# Patient Record
Sex: FEMALE | Race: WHITE | NOT HISPANIC OR LATINO | Employment: FULL TIME | URBAN - METROPOLITAN AREA
[De-identification: names, ages, dates, MRNs, and addresses within clinical notes are randomized per-mention and may not be internally consistent; named-entity substitution may affect disease eponyms.]

---

## 2020-01-21 ENCOUNTER — TRANSCRIBE ORDERS (OUTPATIENT)
Dept: WOMENS IMAGING | Facility: HOSPITAL | Age: 30
End: 2020-01-21

## 2020-01-21 DIAGNOSIS — Z36.89 SCREENING, ANTENATAL, FOR FETAL ANATOMIC SURVEY: Primary | ICD-10-CM

## 2020-02-28 ENCOUNTER — HOSPITAL ENCOUNTER (OUTPATIENT)
Dept: WOMENS IMAGING | Facility: HOSPITAL | Age: 30
Discharge: HOME | End: 2020-02-28
Admitting: OBSTETRICS & GYNECOLOGY

## 2020-02-28 ENCOUNTER — OFFICE VISIT (OUTPATIENT)
Dept: OBSTETRICS AND GYNECOLOGY | Facility: HOSPITAL | Age: 30
End: 2020-02-28

## 2020-02-28 VITALS
WEIGHT: 168.8 LBS | DIASTOLIC BLOOD PRESSURE: 65 MMHG | HEIGHT: 69 IN | BODY MASS INDEX: 25 KG/M2 | SYSTOLIC BLOOD PRESSURE: 101 MMHG

## 2020-02-28 DIAGNOSIS — Z34.90 PREGNANCY, UNSPECIFIED GESTATIONAL AGE: ICD-10-CM

## 2020-02-28 DIAGNOSIS — Z82.79 FAMILY HISTORY OF CONGENITAL HEART DEFECT: Primary | ICD-10-CM

## 2020-02-28 DIAGNOSIS — Z36.89 SCREENING, ANTENATAL, FOR FETAL ANATOMIC SURVEY: ICD-10-CM

## 2020-02-28 DIAGNOSIS — Z84.89 FAMILY HISTORY OF GENETIC DISEASE: ICD-10-CM

## 2020-02-28 PROCEDURE — 93325 DOPPLER ECHO COLOR FLOW MAPG: CPT

## 2020-02-28 PROCEDURE — 76825 ECHO EXAM OF FETAL HEART: CPT | Performed by: OBSTETRICS & GYNECOLOGY

## 2020-02-28 PROCEDURE — 93325 DOPPLER ECHO COLOR FLOW MAPG: CPT | Performed by: OBSTETRICS & GYNECOLOGY

## 2020-02-28 PROCEDURE — 76811 OB US DETAILED SNGL FETUS: CPT | Performed by: OBSTETRICS & GYNECOLOGY

## 2020-02-28 PROCEDURE — 76825 ECHO EXAM OF FETAL HEART: CPT

## 2020-02-28 PROCEDURE — 76811 OB US DETAILED SNGL FETUS: CPT

## 2020-02-28 NOTE — PROGRESS NOTES
Previous child with VSD that resolved by 6 months of age. Denies problems with pregnancy. States NIPT result- Low risk. Next OB appt today.

## 2020-06-16 ENCOUNTER — LAB (OUTPATIENT)
Dept: LAB | Facility: HOSPITAL | Age: 30
End: 2020-06-16

## 2020-06-16 DIAGNOSIS — Z34.83 PRENATAL CARE, SUBSEQUENT PREGNANCY, THIRD TRIMESTER: Primary | ICD-10-CM

## 2020-06-16 PROCEDURE — 87081 CULTURE SCREEN ONLY: CPT

## 2020-06-17 ENCOUNTER — HOSPITAL ENCOUNTER (INPATIENT)
Facility: HOSPITAL | Age: 30
LOS: 1 days | Discharge: HOME | End: 2020-06-18
Attending: OBSTETRICS & GYNECOLOGY | Admitting: OBSTETRICS & GYNECOLOGY

## 2020-06-17 ENCOUNTER — ANESTHESIA (OUTPATIENT)
Dept: LABOR AND DELIVERY | Facility: HOSPITAL | Age: 30
End: 2020-06-17

## 2020-06-17 ENCOUNTER — ANESTHESIA EVENT (OUTPATIENT)
Dept: LABOR AND DELIVERY | Facility: HOSPITAL | Age: 30
End: 2020-06-17

## 2020-06-17 LAB
ABO GROUP BLD: NORMAL
BLD GP AB SCN SERPL QL: NEGATIVE
DEPRECATED RDW RBC AUTO: 44 FL (ref 37–54)
ERYTHROCYTE [DISTWIDTH] IN BLOOD BY AUTOMATED COUNT: 12.7 % (ref 12.3–15.4)
HCT VFR BLD AUTO: 35.9 % (ref 34–46.6)
HGB BLD-MCNC: 11.9 G/DL (ref 12–15.9)
MCH RBC QN AUTO: 31.5 PG (ref 26.6–33)
MCHC RBC AUTO-ENTMCNC: 33.1 G/DL (ref 31.5–35.7)
MCV RBC AUTO: 95 FL (ref 79–97)
PLATELET # BLD AUTO: 216 10*3/MM3 (ref 140–450)
PMV BLD AUTO: 9.3 FL (ref 6–12)
RBC # BLD AUTO: 3.78 10*6/MM3 (ref 3.77–5.28)
RH BLD: POSITIVE
T&S EXPIRATION DATE: NORMAL
WBC NRBC COR # BLD: 12.12 10*3/MM3 (ref 3.4–10.8)

## 2020-06-17 PROCEDURE — 25010000002 FENTANYL CITRATE (PF) 100 MCG/2ML SOLUTION: Performed by: ANESTHESIOLOGY

## 2020-06-17 PROCEDURE — 86900 BLOOD TYPING SEROLOGIC ABO: CPT

## 2020-06-17 PROCEDURE — 25010000002 ROPIVACAINE PER 1 MG: Performed by: ANESTHESIOLOGY

## 2020-06-17 PROCEDURE — 86900 BLOOD TYPING SEROLOGIC ABO: CPT | Performed by: OBSTETRICS & GYNECOLOGY

## 2020-06-17 PROCEDURE — C1755 CATHETER, INTRASPINAL: HCPCS | Performed by: ANESTHESIOLOGY

## 2020-06-17 PROCEDURE — 85027 COMPLETE CBC AUTOMATED: CPT | Performed by: OBSTETRICS & GYNECOLOGY

## 2020-06-17 PROCEDURE — 86901 BLOOD TYPING SEROLOGIC RH(D): CPT

## 2020-06-17 PROCEDURE — 59025 FETAL NON-STRESS TEST: CPT

## 2020-06-17 PROCEDURE — 25010000002 PENICILLIN G POTASSIUM PER 600000 UNITS: Performed by: OBSTETRICS & GYNECOLOGY

## 2020-06-17 PROCEDURE — 86901 BLOOD TYPING SEROLOGIC RH(D): CPT | Performed by: OBSTETRICS & GYNECOLOGY

## 2020-06-17 PROCEDURE — 0KQM0ZZ REPAIR PERINEUM MUSCLE, OPEN APPROACH: ICD-10-PCS | Performed by: OBSTETRICS & GYNECOLOGY

## 2020-06-17 PROCEDURE — C1755 CATHETER, INTRASPINAL: HCPCS

## 2020-06-17 PROCEDURE — 51703 INSERT BLADDER CATH COMPLEX: CPT

## 2020-06-17 PROCEDURE — 86850 RBC ANTIBODY SCREEN: CPT | Performed by: OBSTETRICS & GYNECOLOGY

## 2020-06-17 RX ORDER — LANOLIN
CREAM (ML) TOPICAL
Status: DISCONTINUED | OUTPATIENT
Start: 2020-06-17 | End: 2020-06-18 | Stop reason: HOSPADM

## 2020-06-17 RX ORDER — MISOPROSTOL 200 UG/1
800 TABLET ORAL AS NEEDED
Status: DISCONTINUED | OUTPATIENT
Start: 2020-06-17 | End: 2020-06-17 | Stop reason: HOSPADM

## 2020-06-17 RX ORDER — PROMETHAZINE HYDROCHLORIDE 12.5 MG/1
12.5 SUPPOSITORY RECTAL EVERY 6 HOURS PRN
Status: DISCONTINUED | OUTPATIENT
Start: 2020-06-17 | End: 2020-06-18 | Stop reason: HOSPADM

## 2020-06-17 RX ORDER — SODIUM CHLORIDE 0.9 % (FLUSH) 0.9 %
1-10 SYRINGE (ML) INJECTION AS NEEDED
Status: DISCONTINUED | OUTPATIENT
Start: 2020-06-17 | End: 2020-06-18 | Stop reason: HOSPADM

## 2020-06-17 RX ORDER — HYDROCORTISONE 25 MG/G
1 CREAM TOPICAL AS NEEDED
Status: DISCONTINUED | OUTPATIENT
Start: 2020-06-17 | End: 2020-06-18 | Stop reason: HOSPADM

## 2020-06-17 RX ORDER — ROPIVACAINE HYDROCHLORIDE 2 MG/ML
15 INJECTION, SOLUTION EPIDURAL; INFILTRATION; PERINEURAL CONTINUOUS
Status: DISCONTINUED | OUTPATIENT
Start: 2020-06-17 | End: 2020-06-18 | Stop reason: HOSPADM

## 2020-06-17 RX ORDER — MAGNESIUM CARB/ALUMINUM HYDROX 105-160MG
30 TABLET,CHEWABLE ORAL ONCE
Status: DISCONTINUED | OUTPATIENT
Start: 2020-06-17 | End: 2020-06-17 | Stop reason: HOSPADM

## 2020-06-17 RX ORDER — DOCUSATE SODIUM 100 MG/1
100 CAPSULE, LIQUID FILLED ORAL 2 TIMES DAILY
Status: DISCONTINUED | OUTPATIENT
Start: 2020-06-17 | End: 2020-06-18 | Stop reason: HOSPADM

## 2020-06-17 RX ORDER — SODIUM CHLORIDE, SODIUM LACTATE, POTASSIUM CHLORIDE, CALCIUM CHLORIDE 600; 310; 30; 20 MG/100ML; MG/100ML; MG/100ML; MG/100ML
125 INJECTION, SOLUTION INTRAVENOUS CONTINUOUS
Status: DISCONTINUED | OUTPATIENT
Start: 2020-06-17 | End: 2020-06-18 | Stop reason: HOSPADM

## 2020-06-17 RX ORDER — PRENATAL VIT/IRON FUM/FOLIC AC 27MG-0.8MG
1 TABLET ORAL DAILY
Status: DISCONTINUED | OUTPATIENT
Start: 2020-06-17 | End: 2020-06-18 | Stop reason: HOSPADM

## 2020-06-17 RX ORDER — PROMETHAZINE HYDROCHLORIDE 25 MG/1
25 TABLET ORAL EVERY 6 HOURS PRN
Status: DISCONTINUED | OUTPATIENT
Start: 2020-06-17 | End: 2020-06-18 | Stop reason: HOSPADM

## 2020-06-17 RX ORDER — DIPHENHYDRAMINE HYDROCHLORIDE 50 MG/ML
12.5 INJECTION INTRAMUSCULAR; INTRAVENOUS EVERY 8 HOURS PRN
Status: DISCONTINUED | OUTPATIENT
Start: 2020-06-17 | End: 2020-06-17 | Stop reason: HOSPADM

## 2020-06-17 RX ORDER — EPHEDRINE SULFATE/0.9% NACL/PF 25 MG/5 ML
10 SYRINGE (ML) INTRAVENOUS
Status: DISCONTINUED | OUTPATIENT
Start: 2020-06-17 | End: 2020-06-17 | Stop reason: HOSPADM

## 2020-06-17 RX ORDER — ONDANSETRON 2 MG/ML
4 INJECTION INTRAMUSCULAR; INTRAVENOUS ONCE AS NEEDED
Status: DISCONTINUED | OUTPATIENT
Start: 2020-06-17 | End: 2020-06-17 | Stop reason: HOSPADM

## 2020-06-17 RX ORDER — TRISODIUM CITRATE DIHYDRATE AND CITRIC ACID MONOHYDRATE 500; 334 MG/5ML; MG/5ML
30 SOLUTION ORAL ONCE
Status: DISCONTINUED | OUTPATIENT
Start: 2020-06-17 | End: 2020-06-17 | Stop reason: HOSPADM

## 2020-06-17 RX ORDER — PENICILLIN G 3000000 [IU]/50ML
3 INJECTION, SOLUTION INTRAVENOUS EVERY 4 HOURS
Status: DISPENSED | OUTPATIENT
Start: 2020-06-17 | End: 2020-06-18

## 2020-06-17 RX ORDER — OXYTOCIN-SODIUM CHLORIDE 0.9% IV SOLN 30 UNIT/500ML 30-0.9/5 UT/ML-%
650 SOLUTION INTRAVENOUS ONCE
Status: COMPLETED | OUTPATIENT
Start: 2020-06-17 | End: 2020-06-17

## 2020-06-17 RX ORDER — PENICILLIN G 3000000 [IU]/50ML
3 INJECTION, SOLUTION INTRAVENOUS EVERY 4 HOURS
Status: DISCONTINUED | OUTPATIENT
Start: 2020-06-17 | End: 2020-06-17 | Stop reason: SDUPTHER

## 2020-06-17 RX ORDER — ONDANSETRON 2 MG/ML
4 INJECTION INTRAMUSCULAR; INTRAVENOUS EVERY 6 HOURS PRN
Status: DISCONTINUED | OUTPATIENT
Start: 2020-06-17 | End: 2020-06-17 | Stop reason: HOSPADM

## 2020-06-17 RX ORDER — SODIUM CHLORIDE 0.9 % (FLUSH) 0.9 %
10 SYRINGE (ML) INJECTION AS NEEDED
Status: DISCONTINUED | OUTPATIENT
Start: 2020-06-17 | End: 2020-06-17 | Stop reason: HOSPADM

## 2020-06-17 RX ORDER — BISACODYL 10 MG
10 SUPPOSITORY, RECTAL RECTAL DAILY PRN
Status: DISCONTINUED | OUTPATIENT
Start: 2020-06-18 | End: 2020-06-18 | Stop reason: HOSPADM

## 2020-06-17 RX ORDER — LIDOCAINE HYDROCHLORIDE AND EPINEPHRINE 15; 5 MG/ML; UG/ML
INJECTION, SOLUTION EPIDURAL AS NEEDED
Status: DISCONTINUED | OUTPATIENT
Start: 2020-06-17 | End: 2020-06-17 | Stop reason: SURG

## 2020-06-17 RX ORDER — IBUPROFEN 600 MG/1
600 TABLET ORAL EVERY 6 HOURS PRN
Status: DISCONTINUED | OUTPATIENT
Start: 2020-06-17 | End: 2020-06-18 | Stop reason: HOSPADM

## 2020-06-17 RX ORDER — OXYTOCIN-SODIUM CHLORIDE 0.9% IV SOLN 30 UNIT/500ML 30-0.9/5 UT/ML-%
85 SOLUTION INTRAVENOUS ONCE
Status: COMPLETED | OUTPATIENT
Start: 2020-06-17 | End: 2020-06-17

## 2020-06-17 RX ORDER — SODIUM CHLORIDE 0.9 % (FLUSH) 0.9 %
3 SYRINGE (ML) INJECTION EVERY 12 HOURS SCHEDULED
Status: DISCONTINUED | OUTPATIENT
Start: 2020-06-17 | End: 2020-06-17 | Stop reason: HOSPADM

## 2020-06-17 RX ORDER — HYDROCODONE BITARTRATE AND ACETAMINOPHEN 5; 325 MG/1; MG/1
1 TABLET ORAL EVERY 4 HOURS PRN
Status: DISCONTINUED | OUTPATIENT
Start: 2020-06-17 | End: 2020-06-18 | Stop reason: HOSPADM

## 2020-06-17 RX ORDER — ONDANSETRON 4 MG/1
4 TABLET, FILM COATED ORAL EVERY 6 HOURS PRN
Status: DISCONTINUED | OUTPATIENT
Start: 2020-06-17 | End: 2020-06-17 | Stop reason: HOSPADM

## 2020-06-17 RX ORDER — BUTORPHANOL TARTRATE 1 MG/ML
1 INJECTION, SOLUTION INTRAMUSCULAR; INTRAVENOUS
Status: DISCONTINUED | OUTPATIENT
Start: 2020-06-17 | End: 2020-06-17 | Stop reason: HOSPADM

## 2020-06-17 RX ORDER — METOCLOPRAMIDE HYDROCHLORIDE 5 MG/ML
10 INJECTION INTRAMUSCULAR; INTRAVENOUS ONCE AS NEEDED
Status: DISCONTINUED | OUTPATIENT
Start: 2020-06-17 | End: 2020-06-17 | Stop reason: HOSPADM

## 2020-06-17 RX ORDER — PROMETHAZINE HYDROCHLORIDE 25 MG/ML
12.5 INJECTION, SOLUTION INTRAMUSCULAR; INTRAVENOUS EVERY 6 HOURS PRN
Status: DISCONTINUED | OUTPATIENT
Start: 2020-06-17 | End: 2020-06-18 | Stop reason: HOSPADM

## 2020-06-17 RX ORDER — FAMOTIDINE 10 MG/ML
20 INJECTION, SOLUTION INTRAVENOUS ONCE AS NEEDED
Status: DISCONTINUED | OUTPATIENT
Start: 2020-06-17 | End: 2020-06-17 | Stop reason: HOSPADM

## 2020-06-17 RX ORDER — METHYLERGONOVINE MALEATE 0.2 MG/ML
200 INJECTION INTRAVENOUS ONCE AS NEEDED
Status: DISCONTINUED | OUTPATIENT
Start: 2020-06-17 | End: 2020-06-17 | Stop reason: HOSPADM

## 2020-06-17 RX ORDER — CARBOPROST TROMETHAMINE 250 UG/ML
250 INJECTION, SOLUTION INTRAMUSCULAR AS NEEDED
Status: DISCONTINUED | OUTPATIENT
Start: 2020-06-17 | End: 2020-06-17 | Stop reason: HOSPADM

## 2020-06-17 RX ORDER — LIDOCAINE HYDROCHLORIDE 10 MG/ML
5 INJECTION, SOLUTION EPIDURAL; INFILTRATION; INTRACAUDAL; PERINEURAL AS NEEDED
Status: DISCONTINUED | OUTPATIENT
Start: 2020-06-17 | End: 2020-06-17 | Stop reason: HOSPADM

## 2020-06-17 RX ORDER — ONDANSETRON 4 MG/1
4 TABLET, FILM COATED ORAL EVERY 8 HOURS PRN
Status: DISCONTINUED | OUTPATIENT
Start: 2020-06-17 | End: 2020-06-18 | Stop reason: HOSPADM

## 2020-06-17 RX ORDER — FENTANYL CITRATE 50 UG/ML
INJECTION, SOLUTION INTRAMUSCULAR; INTRAVENOUS AS NEEDED
Status: DISCONTINUED | OUTPATIENT
Start: 2020-06-17 | End: 2020-06-17 | Stop reason: SURG

## 2020-06-17 RX ADMIN — IBUPROFEN 600 MG: 600 TABLET, FILM COATED ORAL at 23:23

## 2020-06-17 RX ADMIN — WITCH HAZEL 1 PAD: 500 SOLUTION RECTAL; TOPICAL at 10:22

## 2020-06-17 RX ADMIN — ROPIVACAINE HYDROCHLORIDE 13 ML: 5 INJECTION, SOLUTION EPIDURAL; INFILTRATION; PERINEURAL at 02:05

## 2020-06-17 RX ADMIN — IBUPROFEN 600 MG: 600 TABLET, FILM COATED ORAL at 07:15

## 2020-06-17 RX ADMIN — Medication 10 MG: at 03:17

## 2020-06-17 RX ADMIN — Medication: at 10:21

## 2020-06-17 RX ADMIN — IBUPROFEN 600 MG: 600 TABLET, FILM COATED ORAL at 17:19

## 2020-06-17 RX ADMIN — FENTANYL CITRATE 100 MCG: 50 INJECTION, SOLUTION INTRAMUSCULAR; INTRAVENOUS at 02:08

## 2020-06-17 RX ADMIN — SODIUM CHLORIDE 5 MILLION UNITS: 900 INJECTION INTRAVENOUS at 02:04

## 2020-06-17 RX ADMIN — Medication 10 MG: at 02:32

## 2020-06-17 RX ADMIN — SODIUM CHLORIDE, POTASSIUM CHLORIDE, SODIUM LACTATE AND CALCIUM CHLORIDE 1000 ML: 600; 310; 30; 20 INJECTION, SOLUTION INTRAVENOUS at 01:20

## 2020-06-17 RX ADMIN — Medication: at 10:23

## 2020-06-17 RX ADMIN — LIDOCAINE HYDROCHLORIDE AND EPINEPHRINE 3 ML: 15; 5 INJECTION, SOLUTION EPIDURAL at 02:00

## 2020-06-17 RX ADMIN — OXYTOCIN 650 ML/HR: 10 INJECTION, SOLUTION INTRAMUSCULAR; INTRAVENOUS at 06:12

## 2020-06-17 RX ADMIN — LIDOCAINE HYDROCHLORIDE AND EPINEPHRINE 2 ML: 15; 5 INJECTION, SOLUTION EPIDURAL at 02:02

## 2020-06-17 RX ADMIN — ROPIVACAINE HYDROCHLORIDE 15 ML/HR: 2 INJECTION, SOLUTION EPIDURAL; INFILTRATION at 02:16

## 2020-06-17 RX ADMIN — OXYTOCIN 85 ML/HR: 10 INJECTION, SOLUTION INTRAMUSCULAR; INTRAVENOUS at 06:43

## 2020-06-17 RX ADMIN — PRENATAL VITAMINS-IRON FUMARATE 27 MG IRON-FOLIC ACID 0.8 MG TABLET 1 TABLET: at 10:22

## 2020-06-17 RX ADMIN — DOCUSATE SODIUM 100 MG: 100 CAPSULE, LIQUID FILLED ORAL at 10:22

## 2020-06-17 NOTE — H&P
"History and Physical  [facility]    Chief Complaint   Patient presents with   • Laboring       Patient Active Problem List   Diagnosis   • Family history of congenital heart defect   • Family history of genetic disease   • Pregnancy       Smita Brown is a 29 y.o. year old  with an Estimated Date of Delivery: 20 currently at 36w3d presenting with regular contractions.    Prenatal care has been with Dr. Molina.  It has been significant for none.    No Additional Complaints Reported    The following portions of the patient's history were reviewed and updated as appropriate:vital signs, allergies, current medications, past medical history, past social history, past surgical history and problem list.    Review of Systems  Pertinent items are noted in HPI.  A comprehensive review of systems was negative.     Objective     /65   Pulse 73   Temp 98.7 °F (37.1 °C) (Oral)   Resp 16   Ht 175.3 cm (69\")   Wt 87.5 kg (193 lb)   LMP 10/06/2019   Breastfeeding Yes   BMI 28.50 kg/m²     Physical Exam    General:  well developed; well nourished  no acute distress           Abdomen: soft, non-tender; no masses  no umbilical or inguinal hernias are present  no hepato-splenomegaly       FHT's: reactive and category 1   Cervix: 7 cm dilated   [place]: Contraction are Q2 minutes     Lab Review   Labs: No data reviewed   Lab Results (last 24 hours)     Procedure Component Value Units Date/Time    CBC (No Diff) [[id number]]  (Abnormal) Collected:  20    Specimen:  Blood Updated:  20     WBC 12.12 10*3/mm3      RBC 3.78 10*6/mm3      Hemoglobin 11.9 g/dL      Hematocrit 35.9 %      MCV 95.0 fL      MCH 31.5 pg      MCHC 33.1 g/dL      RDW 12.7 %      RDW-SD 44.0 fl      MPV 9.3 fL      Platelets 216 10*3/mm3           Imaging   No data reviewed   Imaging Results (Most Recent)     None        Assessment/Plan     ASSESSMENT  1. IUP at 36w3d  2.  onset of labor  3. GBS " unknown    PLAN  1. Expectant management. Will treat with PCN G due to prematurity         Ashu Molina MD  6/17/202001:41

## 2020-06-17 NOTE — PLAN OF CARE
Problem: Breastfeeding (Pediatric,[place],NICU)  Intervention: Support Exclusive Breastfeeding Success  Flowsheets (Taken 2020 174)  Parent/Child Attachment Promotion: cue recognition promoted; face-to-face positioning promoted; parent/caregiver presence encouraged; participation in care promoted; positive reinforcement provided; rooming-in promoted; skin-to-skin contact encouraged  Intervention: Provide Support During Feeding Sessions  Flowsheets (Taken 2020 174)  Breastfeeding Support: assisted with latch; assisted with positioning; electric breast pump used; encouragement provided; feeding on demand promoted; feeding session observed; infant moved to breast; infant latch-on verified; infant-mother separation minimized; support offered; infant stimulated to wakeful state  Note:   Initiated pumping with hospital breast pump and encouraged to pump any time baby doesn't nurse q 2-3 hrs.  Instructed how to use pump, clean kit, and collect breast milk  Intervention: Promote Positive Maternal Experience  Flowsheets (Taken 2020 174)  Psychosocial Support: care explained to patient/family prior to performing; choices provided for parent/caregiver; support provided; supportive/safe environment provided

## 2020-06-17 NOTE — PAYOR COMM NOTE
"Ramesh Brown (29 y.o. Female)   Auth#[id number]  Delivery information    Date of Birth Social Security Number Address Home Phone MRN    [id number]  [id number] [facility] [address] [phone] [id number]    Amish Marital Status          None        Admission Date Admission Type Admitting Provider Attending Provider Department, Room/Bed    6/17/20 Elective Ashu Molina MD Forester, Corey Blake, MD [facility] MOTHER BABY 4B, N441/1    Discharge Date Discharge Disposition Discharge Destination                       Attending Provider:  Ashu Molina MD    Allergies:  No Known Allergies    Isolation:  None   Infection:  None   Code Status:  CPR    Ht:  175.3 cm (69\")   Wt:  87.5 kg (193 lb)    Admission Cmt:  None   Principal Problem:  None                Active Insurance as of 6/17/2020     Primary Coverage     Payor Plan Insurance Group Employer/Plan Group    [facility] [id number]     Payor Plan Address Payor Plan Phone Number Payor Plan Fax Number Effective Dates    [address]   1/12/2019 - None Entered    [facility] [phone]       Subscriber Name Subscriber Birth Date Member ID       RAMESH BROWN 1990 [id number]                 Emergency Contacts      (Rel.) Home Phone Work Phone Mobile Phone    NEGRITA BROWN (Spouse) [phone] -- --            Vital Signs (last day)     Date/Time   Temp   Temp src   Pulse   Resp   BP   Patient Position   SpO2    06/17/20 0900   98.2 (36.8)   Oral   65   16   109/56   --   --    06/17/20 0800   98.2 (36.8)   Oral   70   18   107/56   --   --    06/17/20 0730   --   --   --   16   --   --   --    06/17/20 0720   --   --   70   --   110/61   --   --    06/17/20 0715   --   --   --   16   --   --   --    06/17/20 0710   97.8 (36.6)   Oral   66   16   116/66   Lying   --    06/17/20 0704   --   --   67   16   109/59   --   --    06/17/20 0650   --   --   65   16   112/55 "   --   --    06/17/20 0635   --   --   --   16   108/56   --   --    06/17/20 0620   --   --   84   16   117/59   --   --    06/17/20 0615   97.2 (36.2)   Axillary   77   16   121/69   --   --    06/17/20 0509   --   --   70   16   108/62   --   --    06/17/20 0450   --   --   60   --   111/65   --   --    06/17/20 0435   --   --   64   --   109/62   --   --    06/17/20 0420   --   --   62   --   110/65   --   --    06/17/20 0406   97.7 (36.5)   Oral   --   16   --   --   --    06/17/20 0404   --   --   72   --   109/65   --   --    06/17/20 0356   --   --   69   --   105/63   --   --    06/17/20 0347   --   --   86   --   105/63   --   --    06/17/20 0337   --   --   83   --   105/61   --   --    06/17/20 0326   --   --   75   --   109/59   --   --    06/17/20 0316   --   --   93   16   96/60   --   --    06/17/20 0306   --   --   82   --   106/63   --   --    06/17/20 0257   --   --   74   16   107/65   --   --    06/17/20 0243   --   --   93   16   98/62   --   --    06/17/20 0233   --   --   87   16   90/53   --   --    06/17/20 0231   --   --   86   16   (!) 84/50   --   --    06/17/20 0227   --   --   75   16   124/58   --   --    06/17/20 0224   --   --   78   16   114/58   --   --    06/17/20 0221   --   --   78   16   118/60   --   --    06/17/20 0218   --   --   75   16   113/56   --   --    06/17/20 0215   --   --   72   16   120/61   --   --    06/17/20 0213   --   --   70   16   127/55   --   --    06/17/20 0210   --   --   70   16   111/52   --   --    06/17/20 0207   --   --   74   16   142/90   --   --    06/17/20 0201   --   --   68   16   135/67   --   --    06/17/20 0157   --   --   71   16   118/65   --   --    06/17/20 0137   98.7 (37.1)   Oral   73   16   119/65   --   --              Facility-Administered Medications as of 6/17/2020   Medication Dose Route Frequency Provider Last Rate Last Dose   • benzocaine (AMERICAINE) 20 % rectal ointment 1 application  1 application Rectal PRN ,  Ashu Feilx MD       • benzocaine-menthol (DERMOPLAST) 20-0.5 % topical spray   Topical PRN Ashu Molina MD       • [START ON 6/18/2020] bisacodyl (DULCOLAX) suppository 10 mg  10 mg Rectal Daily PRN Ashu Molina MD       • docusate sodium (COLACE) capsule 100 mg  100 mg Oral BID Ashu Molina MD   100 mg at 06/17/20 1022   • HYDROcodone-acetaminophen (NORCO) 5-325 MG per tablet 1 tablet  1 tablet Oral Q4H PRN Ashu Molina MD       • Hydrocortisone (Perianal) (ANUSOL-HC) 2.5 % rectal cream 1 application  1 application Rectal PRN Ashu Molina MD       • ibuprofen (ADVIL,MOTRIN) tablet 600 mg  600 mg Oral Q6H PRN Ashu Molina MD   600 mg at 06/17/20 0715   • [COMPLETED] lactated ringers bolus 1,000 mL  1,000 mL Intravenous Once PRN Ashu Molina MD 4,000 mL/hr at 06/17/20 0120 1,000 mL at 06/17/20 0120   • lactated ringers infusion  125 mL/hr Intravenous Continuous Ashu Molina MD       • lanolin cream   Topical Q1H PRN Ashu Molina MD       • magnesium hydroxide (MILK OF MAGNESIA) suspension 2400 mg/10mL 10 mL  10 mL Oral Daily PRN Ashu Molina MD       • ondansetron (ZOFRAN) tablet 4 mg  4 mg Oral Q8H PRN Ashu Molina MD       • [COMPLETED] oxytocin in sodium chloride (PITOCIN) 30 UNIT/500ML infusion solution  650 mL/hr Intravenous Once Ashu Molina  mL/hr at 06/17/20 0612 650 mL/hr at 06/17/20 0612    Followed by   • [COMPLETED] oxytocin in sodium chloride (PITOCIN) 30 UNIT/500ML infusion solution  85 mL/hr Intravenous Once Ashu Molina MD 85 mL/hr at 06/17/20 0643 85 mL/hr at 06/17/20 0643   • [COMPLETED] penicillin g 5 mu/100 mL 0.9% NS IVPB (mbp)  5 Million Units Intravenous Once High, Gonzales DORSEY MD   5 Million Units at 06/17/20 0204    Followed by   • penicillin G in iso-osmotic dextrose IVPB 3 million units (premix)  3 Million Units Intravenous Q4H High, Gonzales DORSEY MD       •  prenatal vitamin tablet 1 tablet  1 tablet Oral Daily Ashu Molina MD   1 tablet at 20 1022   • promethazine (PHENERGAN) tablet 25 mg  25 mg Oral Q6H PRN Ashu Molina MD        Or   • promethazine (PHENERGAN) injection 12.5 mg  12.5 mg Intramuscular Q6H PRN Ashu Molina MD        Or   • promethazine (PHENERGAN) suppository 12.5 mg  12.5 mg Rectal Q6H PRN Ashu Molina MD       • ropivacaine (NAROPIN) 0.2 % injection  15 mL/hr Epidural Continuous Fernanda Granados DO 15 mL/hr at 20 0216 15 mL/hr at 20 0216   • sodium chloride 0.9 % flush 1-10 mL  1-10 mL Intravenous PRN Ashu Molina MD       • witch hazel-glycerin (TUCKS) pad 1 pad  1 each Topical PRN Ashu Molina MD   1 pad at 20 1022     Lab Results (last 24 hours)     Procedure Component Value Units Date/Time    CBC (No Diff) [[id number]]  (Abnormal) Collected:  20    Specimen:  Blood Updated:  20     WBC 12.12 10*3/mm3      RBC 3.78 10*6/mm3      Hemoglobin 11.9 g/dL      Hematocrit 35.9 %      MCV 95.0 fL      MCH 31.5 pg      MCHC 33.1 g/dL      RDW 12.7 %      RDW-SD 44.0 fl      MPV 9.3 fL      Platelets 216 10*3/mm3         Imaging Results (Last 24 Hours)     ** No results found for the last 24 hours. **           Operative/Procedure Notes (last 24 hours) (Notes from 20 142 through 20 142)      Ashu Molina MD at 20 0623          [facility]  Vaginal Delivery Note    Delivery     Delivery: Vaginal, Spontaneous     YOB: 2022    Time of Birth:  Gestational Age 6:07 AM   36w3d     Anesthesia: Epidural     Delivering clinician: Ashu Molina    Forceps?   No   Vacuum? No    Shoulder dystocia present: No        Delivery narrative:  The patient delivered a vigorously crying male infant by  without complications. Suction was performed at delivery and the infant placed on maternal chest. Cord milking  performed x 4 with delayed cord clamping. Pitocin was given and placenta delivered with gentle downward traction. A 2nd degree laceration repaired with 3.0 vicryl. Good hemostasis noted.     Infant    Findings: male  infant     Infant observations: Weight: 3045 g (6 lb 11.4 oz)   Length: 19  in  Observations/Comments:         Apgars:    @ 1 minute /       @ 5 minutes   Infant Name: Baby boy     Placenta, Cord, and Fluid    Placenta delivered  Spontaneous  at   2020  6:12 AM     Cord:    present.   Nuchal Cord?  yes; Number of nuchal loops present:       Cord blood obtained:   yes   Cord gases obtained:    no   Cord gas results: Venous:  No results found for: PHCVEN    Arterial:  No results found for: PHCART     Repair    Episiotomy: None     No    Lacerations: Yes  Laceration Information  Laceration Repaired?   Perineal: 2nd   yes   Periurethral:         Labial:         Sulcus:         Vaginal:         Cervical:           Suture used for repair: 3-0 Vicryl   Estimated Blood Loss:  200ml           Complications   delivery    Disposition  Mother to Mother Baby/Postpartum  in stable condition currently.  Baby to NBN  in stable condition currently.      Ashu Molina MD  20  06:23        Electronically signed by Ashu Molina MD at 20 0626       Physician Progress Notes (last 24 hours) (Notes from 20 1422 through 20 142)    No notes of this type exist for this encounter.         Consult Notes (last 24 hours) (Notes from 20 1422 through 20 1422)    No notes of this type exist for this encounter.

## 2020-06-17 NOTE — ANESTHESIA PROCEDURE NOTES
Labor Epidural      Patient reassessed immediately prior to procedure    Patient location during procedure: OB  Performed By  Anesthesiologist: Fernnada Granados DO  Preanesthetic Checklist  Completed: patient identified, surgical consent, pre-op evaluation, timeout performed, IV checked, risks and benefits discussed and monitors and equipment checked  Additional Notes  DPE performed using 25g Sheela  Prep:  Pt Position:sitting  Sterile Tech:cap, gloves, mask and sterile barrier  Prep:DuraPrep  Monitoring:blood pressure monitoring  Epidural Block Procedure:  Approach:midline  Guidance:palpation technique  Location:L4-L5  Needle Type:Tuohy  Needle Gauge:17 G  Loss of Resistance Medium: air  Loss of Resistance: 5cm  Cath Depth at skin:11 cm  Paresthesia: none  Aspiration:negative  Test Dose:negative  Number of Attempts: 1  Post Assessment:  Dressing:occlusive dressing applied and secured with tape  Pt Tolerance:patient tolerated the procedure well with no apparent complications  Complications:no

## 2020-06-17 NOTE — LACTATION NOTE
06/17/20 8800   Maternal Information   Person Making Referral other (see comments)  (Courtesy visit, Teaching done for 2nd time bfing mom)   Maternal Reason for Referral breastfeeding currently   Infant Reason for Referral 35-37 weeks gestation   Maternal Assessment   Breast Size Issue none   Breast Shape Bilateral:;round   Breast Density Bilateral:;soft   Nipples Bilateral:;graspable;short   Maternal Infant Feeding   Maternal Emotional State assist needed;relaxed   Infant Positioning clutch/football   Signs of Milk Transfer audible swallow   Pain with Feeding no   Comfort Measures Before/During Feeding infant position adjusted;latch adjusted;suction broken using finger   Comfort Measures Following Feeding air-drying encouraged;other (see comments)  (Lanolin encouraged)   Nipple Shape After Feeding, Left Breast round;symmetrical   Nipple Shape After Feeding, Right round;symmetrical   Latch Assistance yes   Equipment Type   Breast Pump Type double electric, hospital grade;other (see comments)  (Has Spectra breast pump at home)   Breast Pump Flange Type hard   Breast Pump Flange Size 24 mm   Breast Pumping   Breast Pumping Interventions frequent pumping encouraged  (Pump anytime baby doesn't nurse 2-3 hrs)   Breast Pumping other (see comments)  (Pump on initiation phase until pump shuts down)

## 2020-06-17 NOTE — L&D DELIVERY NOTE
[facility]  Vaginal Delivery Note    Delivery     Delivery: Vaginal, Spontaneous     YOB: 2022    Time of Birth:  Gestational Age 6:07 AM   36w3d     Anesthesia: Epidural     Delivering clinician: Ashu Molina    Forceps?   No   Vacuum? No    Shoulder dystocia present: No        Delivery narrative:  The patient delivered a vigorously crying male infant by  without complications. Suction was performed at delivery and the infant placed on maternal chest. Cord milking performed x 4 with delayed cord clamping. Pitocin was given and placenta delivered with gentle downward traction. A 2nd degree laceration repaired with 3.0 vicryl. Good hemostasis noted.     Infant    Findings: male  infant     Infant observations: Weight: 3045 g (6 lb 11.4 oz)   Length: 19  in  Observations/Comments:         Apgars:    @ 1 minute /       @ 5 minutes   Infant Name: Baby boy     Placenta, Cord, and Fluid    Placenta delivered  Spontaneous  at   2020  6:12 AM     Cord:    present.   Nuchal Cord?  yes; Number of nuchal loops present:       Cord blood obtained:   yes   Cord gases obtained:    no   Cord gas results: Venous:  No results found for: PHCVEN    Arterial:  No results found for: PHCART     Repair    Episiotomy: None     No    Lacerations: Yes  Laceration Information  Laceration Repaired?   Perineal: 2nd   yes   Periurethral:         Labial:         Sulcus:         Vaginal:         Cervical:           Suture used for repair: 3-0 Vicryl   Estimated Blood Loss:  200ml           Complications   delivery    Disposition  Mother to Mother Baby/Postpartum  in stable condition currently.  Baby to NBN  in stable condition currently.      Ashu Molina MD  20  06:23

## 2020-06-18 VITALS
BODY MASS INDEX: 28.58 KG/M2 | HEIGHT: 69 IN | RESPIRATION RATE: 16 BRPM | HEART RATE: 61 BPM | SYSTOLIC BLOOD PRESSURE: 120 MMHG | WEIGHT: 193 LBS | TEMPERATURE: 98.2 F | DIASTOLIC BLOOD PRESSURE: 61 MMHG

## 2020-06-18 LAB
BASOPHILS # BLD AUTO: 0.05 10*3/MM3 (ref 0–0.2)
BASOPHILS NFR BLD AUTO: 0.5 % (ref 0–1.5)
DEPRECATED RDW RBC AUTO: 45.1 FL (ref 37–54)
EOSINOPHIL # BLD AUTO: 0.13 10*3/MM3 (ref 0–0.4)
EOSINOPHIL NFR BLD AUTO: 1.4 % (ref 0.3–6.2)
ERYTHROCYTE [DISTWIDTH] IN BLOOD BY AUTOMATED COUNT: 13 % (ref 12.3–15.4)
HCT VFR BLD AUTO: 35.6 % (ref 34–46.6)
HGB BLD-MCNC: 11.6 G/DL (ref 12–15.9)
IMM GRANULOCYTES # BLD AUTO: 0.2 10*3/MM3 (ref 0–0.05)
IMM GRANULOCYTES NFR BLD AUTO: 2.1 % (ref 0–0.5)
LYMPHOCYTES # BLD AUTO: 1.9 10*3/MM3 (ref 0.7–3.1)
LYMPHOCYTES NFR BLD AUTO: 20.2 % (ref 19.6–45.3)
MCH RBC QN AUTO: 31.1 PG (ref 26.6–33)
MCHC RBC AUTO-ENTMCNC: 32.6 G/DL (ref 31.5–35.7)
MCV RBC AUTO: 95.4 FL (ref 79–97)
MONOCYTES # BLD AUTO: 0.54 10*3/MM3 (ref 0.1–0.9)
MONOCYTES NFR BLD AUTO: 5.7 % (ref 5–12)
NEUTROPHILS # BLD AUTO: 6.6 10*3/MM3 (ref 1.7–7)
NEUTROPHILS NFR BLD AUTO: 70.1 % (ref 42.7–76)
NRBC BLD AUTO-RTO: 0 /100 WBC (ref 0–0.2)
PLATELET # BLD AUTO: 229 10*3/MM3 (ref 140–450)
PMV BLD AUTO: 9.3 FL (ref 6–12)
RBC # BLD AUTO: 3.73 10*6/MM3 (ref 3.77–5.28)
WBC NRBC COR # BLD: 9.42 10*3/MM3 (ref 3.4–10.8)

## 2020-06-18 PROCEDURE — 85025 COMPLETE CBC W/AUTO DIFF WBC: CPT | Performed by: OBSTETRICS & GYNECOLOGY

## 2020-06-18 RX ORDER — IBUPROFEN 600 MG/1
600 TABLET ORAL EVERY 6 HOURS PRN
Qty: 30 TABLET | Refills: 3 | Status: SHIPPED | OUTPATIENT
Start: 2020-06-18 | End: 2022-12-27

## 2020-06-18 RX ADMIN — IBUPROFEN 600 MG: 600 TABLET, FILM COATED ORAL at 07:42

## 2020-06-18 NOTE — LACTATION NOTE
Patient continues to breastfeed, pump, and supplement infant with pumped milk.  Infant appears to latch and breastfeed well, and patient said it is going better today.

## 2020-06-18 NOTE — DISCHARGE SUMMARY
Discharge Summary    Date of Admission: 2020  Date of Discharge:  2020      Patient: Smita Brown      MR#:[id number]    Delivery Provider: Ashu Molina     Discharge Surgeon/OB: Ashu Molina    Presenting Problem/History of Present Illness  Term pregnancy [Z34.90]     Patient Active Problem List    Diagnosis   • Family history of congenital heart defect [Z82.79]   • Family history of genetic disease [Z84.89]   • Pregnancy [Z34.90]         Discharge Diagnosis: Vaginal delivery at 36w3d    Procedures:  Vaginal, Spontaneous     2020    6:07 AM        Discharge Date: 2020;     Hospital Course  Patient is a 29 y.o. female  at 36w3d status post vaginal delivery without complication. Postpartum the patient did well. She remained afebrile, with vital signs stable. She was ready for discharge on postpartum day 2.     Infant:   male  fetus 3045 g (6 lb 11.4 oz)  with Apgar scores of 8  , 9   at five minutes.    Condition on Discharge:  Stable    Vital Signs  Temp:  [97.5 °F (36.4 °C)-98.5 °F (36.9 °C)] 98.2 °F (36.8 °C)  Heart Rate:  [61-67] 61  Resp:  [14-18] 16  BP: (105-120)/(55-63) 120/61    Lab Results   Component Value Date    WBC 9.42 2020    HGB 11.6 (L) 2020    HCT 35.6 2020    MCV 95.4 2020     2020       Discharge Disposition  Home    Discharge Medications     Discharge Medications      ASK your doctor about these medications      Instructions Start Date   PRENATAL VITAMIN PO   1 tablet, Oral, Daily             Discharge Diet: Regular    Activity at Discharge: Pelvic rest     Follow-up Appointments  Future Appointments   Date Time Provider Department Center   7/3/2020 12:45 PM COVID-19 [facility] C[facility]   2020  9:30 PM [facility] LD INDUCTION ROOM 1 [facility]         Ashu Molina MD  20  14:01  [facility]

## 2020-06-19 LAB — BACTERIA SPEC AEROBE CULT: NORMAL

## 2020-07-03 ENCOUNTER — APPOINTMENT (OUTPATIENT)
Dept: PREADMISSION TESTING | Facility: HOSPITAL | Age: 30
End: 2020-07-03

## 2020-07-03 PROCEDURE — C9803 HOPD COVID-19 SPEC COLLECT: HCPCS

## 2020-07-06 ENCOUNTER — HOSPITAL ENCOUNTER (OUTPATIENT)
Dept: LABOR AND DELIVERY | Facility: HOSPITAL | Age: 30
End: 2020-07-06

## 2020-12-02 ENCOUNTER — OFFICE VISIT (OUTPATIENT)
Dept: OBSTETRICS AND GYNECOLOGY | Facility: CLINIC | Age: 30
End: 2020-12-02

## 2020-12-02 VITALS
SYSTOLIC BLOOD PRESSURE: 102 MMHG | HEIGHT: 69 IN | WEIGHT: 165.1 LBS | DIASTOLIC BLOOD PRESSURE: 68 MMHG | BODY MASS INDEX: 24.45 KG/M2

## 2020-12-02 DIAGNOSIS — Z00.00 ANNUAL PHYSICAL EXAM: Primary | ICD-10-CM

## 2020-12-02 PROCEDURE — 99395 PREV VISIT EST AGE 18-39: CPT | Performed by: OBSTETRICS & GYNECOLOGY

## 2020-12-02 NOTE — PROGRESS NOTES
GYN Annual Exam     CC - Here for annual exam.     Subjective   HPI  Smita Brown is a 30 y.o. female, , who presents for annual well woman exam. Patient's last menstrual period was 11/15/2020 (exact date)..  Periods are regular every 25-35 days, lasting 3 days. The patient uses 1 of tampons/pads per hour., lasting 3 days.  Dysmenorrhea:none.  Patient reports problems with: none.  Partner Status: Marital Status: .  New Partners since last visit: no.  Desires STD Screening: no.    Additional OB/GYN History   Current contraception: contraceptive methods: None  Desires to: do not start contraception  Last Pap : 2019 - negative  Last Completed Pap Smear       Status Date      PAP SMEAR No completions recorded        History of abnormal Pap smear: yes - 5 years ago - followed by [facility] that came back normal.  Family history of uterine, colon, breast, or ovarian cancer: no  Performs monthly Self-Breast Exam: yes  Exercises Regularly:yes  Feelings of Anxiety or Depression: no  Tobacco Usage?: No   OB History        2    Para   2    Term   1       1    AB   0    Living   2       SAB   0    TAB   0    Ectopic   0    Molar   0    Multiple   0    Live Births   2                Health Maintenance   Topic Date Due   • Annual Gynecologic Pelvic and Breast Exam  1990   • ANNUAL PHYSICAL  1991   • TDAP/TD VACCINES (1 - Tdap) 2007   • HEPATITIS C SCREENING  2020   • PAP SMEAR  2020   • INFLUENZA VACCINE  2020   • Pneumococcal Vaccine 0-64  Aged Out       The additional following portions of the patient's history were reviewed and updated as appropriate: allergies, current medications, past family history, past medical history, past social history, past surgical history and problem list.    Review of Systems   Constitutional: Negative for activity change and appetite change.   Eyes: Negative for visual disturbance.   Respiratory: Negative for cough and shortness  "of breath.    Cardiovascular: Negative for chest pain and palpitations.   Gastrointestinal: Negative for abdominal distention, abdominal pain, nausea and rectal pain.   Genitourinary: Negative for breast discharge, breast lump, breast pain, menstrual problem and pelvic pain.   Musculoskeletal: Negative for back pain.   Neurological: Negative for light-headedness and headache.   Psychiatric/Behavioral: Negative for behavioral problems.       I have reviewed and agree with the HPI, ROS, and historical information as entered above. Ashu Molina MD    Objective   /68   Ht 175.3 cm (69\")   Wt 74.9 kg (165 lb 1.6 oz)   LMP 11/15/2020 (Exact Date)   BMI 24.38 kg/m²     Physical Exam  Vitals signs reviewed. Exam conducted with a chaperone present.   Constitutional:       Appearance: Normal appearance. She is normal weight.   HENT:      Head: Normocephalic and atraumatic.   Cardiovascular:      Rate and Rhythm: Normal rate and regular rhythm.   Pulmonary:      Effort: Pulmonary effort is normal.      Breath sounds: Normal breath sounds.   Chest:      Breasts:         Right: Normal.         Left: Normal.   Abdominal:      General: Abdomen is flat. Bowel sounds are normal.      Palpations: Abdomen is soft.   Genitourinary:     General: Normal vulva.      Vagina: Normal.      Cervix: Normal.      Uterus: Normal.       Adnexa: Right adnexa normal and left adnexa normal.      Rectum: Normal.   Skin:     General: Skin is warm and dry.   Neurological:      Mental Status: She is alert and oriented to person, place, and time.   Psychiatric:         Mood and Affect: Mood normal.         Assessment/Plan     Assessment     Problem List Items Addressed This Visit     None      Visit Diagnoses     Annual physical exam    -  Primary    Relevant Orders    Pap IG, HPV-hr    CBC (No Diff)    Hemoglobin A1c    Lipid Panel    TSH    Vitamin D 25 Hydroxy    Comprehensive Metabolic Panel          1. GYN annual well woman exam. "     Plan     1. Reviewed monthly self breast exams.  Instructed to call with lumps, pain, or breast discharge.    2. Reviewed exercise as a preventative health measures.   3. Reccommended Flu Vaccine in Fall of each year.  4. RTC in 1 year or PRN with problems      Ashu Molina MD  12/02/2020

## 2020-12-02 NOTE — PATIENT INSTRUCTIONS
Preventive Care 21-39 Years Old, Female  Preventive care refers to visits with your health care provider and lifestyle choices that can promote health and wellness. This includes:  · A yearly physical exam. This may also be called an annual well check.  · Regular dental visits and eye exams.  · Immunizations.  · Screening for certain conditions.  · Healthy lifestyle choices, such as eating a healthy diet, getting regular exercise, not using drugs or products that contain nicotine and tobacco, and limiting alcohol use.  What can I expect for my preventive care visit?  Physical exam  Your health care provider will check your:  · Height and weight. This may be used to calculate body mass index (BMI), which tells if you are at a healthy weight.  · Heart rate and blood pressure.  · Skin for abnormal spots.  Counseling  Your health care provider may ask you questions about your:  · Alcohol, tobacco, and drug use.  · Emotional well-being.  · Home and relationship well-being.  · Sexual activity.  · Eating habits.  · Work and work environment.  · Method of birth control.  · Menstrual cycle.  · Pregnancy history.  What immunizations do I need?    Influenza (flu) vaccine  · This is recommended every year.  Tetanus, diphtheria, and pertussis (Tdap) vaccine  · You may need a Td booster every 10 years.  Varicella (chickenpox) vaccine  · You may need this if you have not been vaccinated.  Human papillomavirus (HPV) vaccine  · If recommended by your health care provider, you may need three doses over 6 months.  Measles, mumps, and rubella (MMR) vaccine  · You may need at least one dose of MMR. You may also need a second dose.  Meningococcal conjugate (MenACWY) vaccine  · One dose is recommended if you are age 19-21 years and a first-year college student living in a residence [place], or if you have one of several medical conditions. You may also need additional booster doses.  Pneumococcal conjugate (PCV13) vaccine  · You may need  this if you have certain conditions and were not previously vaccinated.  Pneumococcal polysaccharide (PPSV23) vaccine  · You may need one or two doses if you smoke cigarettes or if you have certain conditions.  Hepatitis A vaccine  · You may need this if you have certain conditions or if you travel or work in places where you may be exposed to hepatitis A.  Hepatitis B vaccine  · You may need this if you have certain conditions or if you travel or work in places where you may be exposed to hepatitis B.  Haemophilus influenzae type b (Hib) vaccine  · You may need this if you have certain conditions.  You may receive vaccines as individual doses or as more than one vaccine together in one shot (combination vaccines). Talk with your health care provider about the risks and benefits of combination vaccines.  What tests do I need?    Blood tests  · Lipid and cholesterol levels. These may be checked every 5 years starting at age 20.  · Hepatitis C test.  · Hepatitis B test.  Screening  · Diabetes screening. This is done by checking your blood sugar (glucose) after you have not eaten for a while (fasting).  · Sexually transmitted disease (STD) testing.  · BRCA-related cancer screening. This may be done if you have a family history of breast, ovarian, tubal, or peritoneal cancers.  · Pelvic exam and Pap test. This may be done every 3 years starting at age 21. Starting at age 30, this may be done every 5 years if you have a Pap test in combination with an HPV test.  Talk with your health care provider about your test results, treatment options, and if necessary, the need for more tests.  Follow these instructions at home:  Eating and drinking    · Eat a diet that includes fresh fruits and vegetables, whole grains, lean protein, and low-fat dairy.  · Take vitamin and mineral supplements as recommended by your health care provider.  · Do not drink alcohol if:  ? Your health care provider tells you not to drink.  ? You are  pregnant, may be pregnant, or are planning to become pregnant.  · If you drink alcohol:  ? Limit how much you have to 0-1 drink a day.  ? Be aware of how much alcohol is in your drink. In the [place] one drink equals one 12 oz bottle of beer (355 mL), one 5 oz glass of wine (148 mL), or one 1½ oz glass of hard liquor (44 mL).  Lifestyle  · Take daily care of your teeth and gums.  · Stay active. Exercise for at least 30 minutes on 5 or more days each week.  · Do not use any products that contain nicotine or tobacco, such as cigarettes, e-cigarettes, and chewing tobacco. If you need help quitting, ask your health care provider.  · If you are sexually active, practice safe sex. Use a condom or other form of birth control (contraception) in order to prevent pregnancy and STIs (sexually transmitted infections). If you plan to become pregnant, see your health care provider for a preconception visit.  What's next?  · Visit your health care provider once a year for a well check visit.  · Ask your health care provider how often you should have your eyes and teeth checked.  · Stay up to date on all vaccines.  This information is not intended to replace advice given to you by your health care provider. Make sure you discuss any questions you have with your health care provider.  Document Revised: 08/29/2019 Document Reviewed: 08/29/2019  [employer] Patient Education © 2020 [employer]

## 2020-12-07 DIAGNOSIS — Z00.00 ANNUAL PHYSICAL EXAM: ICD-10-CM

## 2020-12-16 ENCOUNTER — RESULTS ENCOUNTER (OUTPATIENT)
Dept: OBSTETRICS AND GYNECOLOGY | Facility: CLINIC | Age: 30
End: 2020-12-16

## 2020-12-16 DIAGNOSIS — Z00.00 ANNUAL PHYSICAL EXAM: ICD-10-CM

## 2020-12-18 ENCOUNTER — LAB (OUTPATIENT)
Dept: OBSTETRICS AND GYNECOLOGY | Facility: CLINIC | Age: 30
End: 2020-12-18

## 2020-12-19 LAB
25(OH)D3+25(OH)D2 SERPL-MCNC: 21 NG/ML (ref 30–100)
ALBUMIN SERPL-MCNC: 4.5 G/DL (ref 3.5–5.2)
ALBUMIN/GLOB SERPL: 2 G/DL
ALP SERPL-CCNC: 67 U/L (ref 39–117)
ALT SERPL-CCNC: 17 U/L (ref 1–33)
AST SERPL-CCNC: 21 U/L (ref 1–32)
BILIRUB SERPL-MCNC: 0.3 MG/DL (ref 0–1.2)
BUN SERPL-MCNC: 14 MG/DL (ref 6–20)
BUN/CREAT SERPL: 16.1 (ref 7–25)
CALCIUM SERPL-MCNC: 9.2 MG/DL (ref 8.6–10.5)
CHLORIDE SERPL-SCNC: 104 MMOL/L (ref 98–107)
CHOLEST SERPL-MCNC: 181 MG/DL (ref 0–200)
CO2 SERPL-SCNC: 28.2 MMOL/L (ref 22–29)
CREAT SERPL-MCNC: 0.87 MG/DL (ref 0.57–1)
ERYTHROCYTE [DISTWIDTH] IN BLOOD BY AUTOMATED COUNT: 12 % (ref 12.3–15.4)
GLOBULIN SER CALC-MCNC: 2.3 GM/DL
GLUCOSE SERPL-MCNC: 73 MG/DL (ref 65–99)
HBA1C MFR BLD: 4.9 % (ref 4.8–5.6)
HCT VFR BLD AUTO: 36.7 % (ref 34–46.6)
HDLC SERPL-MCNC: 78 MG/DL (ref 40–60)
HGB BLD-MCNC: 12.7 G/DL (ref 12–15.9)
LDLC SERPL CALC-MCNC: 91 MG/DL (ref 0–100)
MCH RBC QN AUTO: 31.8 PG (ref 26.6–33)
MCHC RBC AUTO-ENTMCNC: 34.6 G/DL (ref 31.5–35.7)
MCV RBC AUTO: 91.8 FL (ref 79–97)
PLATELET # BLD AUTO: 264 10*3/MM3 (ref 140–450)
POTASSIUM SERPL-SCNC: 4.6 MMOL/L (ref 3.5–5.2)
PROT SERPL-MCNC: 6.8 G/DL (ref 6–8.5)
RBC # BLD AUTO: 4 10*6/MM3 (ref 3.77–5.28)
SODIUM SERPL-SCNC: 140 MMOL/L (ref 136–145)
TRIGL SERPL-MCNC: 61 MG/DL (ref 0–150)
TSH SERPL DL<=0.005 MIU/L-ACNC: 0.97 UIU/ML (ref 0.27–4.2)
VLDLC SERPL CALC-MCNC: 12 MG/DL (ref 5–40)
WBC # BLD AUTO: 5.04 10*3/MM3 (ref 3.4–10.8)

## 2020-12-21 RX ORDER — ERGOCALCIFEROL 1.25 MG/1
50000 CAPSULE ORAL WEEKLY
Qty: 12 CAPSULE | Refills: 0 | Status: SHIPPED | OUTPATIENT
Start: 2020-12-21 | End: 2021-01-02

## 2021-01-14 ENCOUNTER — IMMUNIZATION (OUTPATIENT)
Dept: VACCINE CLINIC | Facility: HOSPITAL | Age: 31
End: 2021-01-14

## 2021-01-14 PROCEDURE — 0001A: CPT | Performed by: INTERNAL MEDICINE

## 2021-01-14 PROCEDURE — 91300 HC SARSCOV02 VAC 30MCG/0.3ML IM: CPT | Performed by: INTERNAL MEDICINE

## 2021-02-04 ENCOUNTER — IMMUNIZATION (OUTPATIENT)
Dept: VACCINE CLINIC | Facility: HOSPITAL | Age: 31
End: 2021-02-04

## 2021-02-04 PROCEDURE — 0002A: CPT | Performed by: INTERNAL MEDICINE

## 2021-02-04 PROCEDURE — 91300 HC SARSCOV02 VAC 30MCG/0.3ML IM: CPT | Performed by: INTERNAL MEDICINE

## 2021-08-16 ENCOUNTER — TELEPHONE (OUTPATIENT)
Dept: OBSTETRICS AND GYNECOLOGY | Facility: CLINIC | Age: 31
End: 2021-08-16

## 2021-08-16 NOTE — TELEPHONE ENCOUNTER
[pt lvm stating that she has some questions about whether or not she needs to be seen. She stated she has something going on but did not state what it was.

## 2021-08-16 NOTE — TELEPHONE ENCOUNTER
UPT was positive Friday, two days late for her period, with 3 negative UPT's not wanting to be pregnant. Come in for hcg, She has questions about has she had a miscarriage or what, since she is very regular ith her cycles. She can have labs or apt and labs with Dr. Molina.

## 2021-08-17 ENCOUNTER — OFFICE VISIT (OUTPATIENT)
Dept: OBSTETRICS AND GYNECOLOGY | Facility: CLINIC | Age: 31
End: 2021-08-17

## 2021-08-17 VITALS
WEIGHT: 147.3 LBS | SYSTOLIC BLOOD PRESSURE: 118 MMHG | HEIGHT: 69 IN | DIASTOLIC BLOOD PRESSURE: 78 MMHG | BODY MASS INDEX: 21.82 KG/M2

## 2021-08-17 DIAGNOSIS — Z32.00 UNCONFIRMED PREGNANCY: Primary | ICD-10-CM

## 2021-08-17 PROCEDURE — 99212 OFFICE O/P EST SF 10 MIN: CPT | Performed by: OBSTETRICS & GYNECOLOGY

## 2021-08-17 NOTE — PROGRESS NOTES
Chief Complaint   Patient presents with   • Positive Pregnancy Test     recent       Subjective   HPI  Smita Brown is a 31 y.o. female, , who presents for positive UPT last Friday and then two negative UPT the day after. Patient spoke with phone nurse yesterday and was offered to come in for labs but patient requested to come in for labs and office visit to discuss.    Patient denies vaginal bleeding, cramping. The patient reports additional symptoms as none.      Her last LMP was Patient's last menstrual period was 2021 (exact date).  Periods are regular every 28-30 days, lasting 4 days.  Dysmenorrhea:none.  Patient reports problems with: none.  Partner Status: Marital Status: .  New Partners since last visit: no.  Desires STD Screening: no.    Additional OB/GYN History   Current contraception: contraceptive methods: None  Desires to: do not start contraception  Last Pap :   Last Completed Pap Smear          Ordered - PAP SMEAR (Every 3 Years) Ordered on 2020  SCANNED - PAP SMEAR              History of abnormal Pap smear: , patient is unsure of results but states colposcopy was done that resulted in negative.  Last mammogram: never  Last Completed Mammogram     This patient has no relevant Health Maintenance data.        Tobacco Usage?: No   OB History        2    Para   2    Term   1       1    AB   0    Living   2       SAB   0    TAB   0    Ectopic   0    Molar   0    Multiple   0    Live Births   2                Health Maintenance   Topic Date Due   • TDAP/TD VACCINES (1 - Tdap) Never done   • HEPATITIS C SCREENING  Never done   • INFLUENZA VACCINE  10/01/2021   • ANNUAL PHYSICAL  2021   • Annual Gynecologic Pelvic and Breast Exam  2021   • PAP SMEAR  2023   • COVID-19 Vaccine  Completed   • Pneumococcal Vaccine 0-64  Aged Out       The additional following portions of the patient's history were reviewed and updated as  "appropriate: allergies, current medications, past family history, past medical history, past social history, past surgical history and problem list.    Review of Systems   Constitutional: Negative for activity change, appetite change, unexpected weight gain and unexpected weight loss.   Eyes: Negative for visual disturbance.   Respiratory: Negative for cough and shortness of breath.    Cardiovascular: Negative for chest pain and palpitations.   Genitourinary: Positive for amenorrhea. Negative for pelvic pain.   Musculoskeletal: Negative for back pain.   Neurological: Negative for light-headedness and headache.   Psychiatric/Behavioral: Negative for agitation, behavioral problems, decreased concentration and depressed mood.       I have reviewed and agree with the HPI, ROS, and historical information as entered above. Ashu Molina MD    Objective   /78   Ht 175.3 cm (69\")   Wt 66.8 kg (147 lb 4.8 oz)   LMP 07/19/2021 (Exact Date)   Breastfeeding No   BMI 21.75 kg/m²     Physical Exam  Vitals reviewed.   Constitutional:       Appearance: Normal appearance. She is normal weight.   HENT:      Head: Normocephalic and atraumatic.   Abdominal:      General: Abdomen is flat. Bowel sounds are normal.      Palpations: Abdomen is soft.   Skin:     General: Skin is warm and dry.   Neurological:      Mental Status: She is alert and oriented to person, place, and time.   Psychiatric:         Mood and Affect: Mood normal.         Behavior: Behavior normal.         Assessment/Plan     Assessment     Problem List Items Addressed This Visit     None      Visit Diagnoses     Unconfirmed pregnancy    -  Primary    Relevant Orders    HCG, B-subunit, Quantitative    HCG, B-subunit, Quantitative          Plan   1. Missed her last period with pos/neg UPT.  Will draw hcg level today and follow up as needed.   2. Discussed ectopic precautions      Ashu Molina MD  08/17/2021  "

## 2021-08-17 NOTE — PATIENT INSTRUCTIONS
Ectopic Pregnancy    An ectopic pregnancy happens when a fertilized egg grows outside the womb (uterus). The fertilized egg cannot stay alive outside of the womb. This problem often happens in a fallopian tube. It is often caused by damage to the tube.  If this problem is found early, you may be treated with medicine that stops the egg from growing. If your tube tears or bursts open (ruptures), you will bleed inside. Often, there is very bad pain in the lower belly. This is an emergency. You will need surgery. Get help right away.  Follow these instructions at home:  After being treated with medicine or surgery:  · Rest and limit your activity for as long as told by your doctor.  · Until your doctor says that it is safe:  ? Do not lift anything that is heavier than 10 lb (4.5 kg) or the limit that your doctor tells you.  ? Avoid exercise and any movement that takes a lot of effort.  · To prevent problems when pooping (constipation):  ? Eat a healthy diet. This includes:  § Fruits.  § Vegetables.  § Whole grains.  ? Drink 6-8 glasses of water a day.  Contact a doctor if:  Get help right away if:  · You have sudden and very bad pain in your belly.  · You have very bad pain in your shoulders or neck.  · You have pain that gets worse and is not helped by medicine.  · You have:  ? A fever or chills.  ? Vaginal bleeding.  ? Redness or swelling at the site of a surgical cut (incision).  · You feel sick to your stomach (nauseous) or you throw up (vomit).  · You feel dizzy or weak.  · You feel light-headed or you pass out (faint).  Summary  · An ectopic pregnancy happens when a fertilized egg grows outside the womb (uterus).  · If this problem is found early, you may be treated with medicine that stops the egg from growing.  · If your tube tears or bursts open (ruptures), you will need surgery. This is an emergency. Get help right away.  This information is not intended to replace advice given to you by your health care  provider. Make sure you discuss any questions you have with your health care provider.  Document Revised: 11/30/2018 Document Reviewed: 01/11/2018  [employer] Patient Education © 2020 [employer]

## 2021-08-18 LAB — HCG INTACT+B SERPL-ACNC: <0.5 MIU/ML

## 2022-04-13 NOTE — ANESTHESIA POSTPROCEDURE EVALUATION
Patient: Smita Brown    Procedure Summary     Date:  06/17/20 Room / Location:      Anesthesia Start:  0150 Anesthesia Stop:  0612    Procedure:  LABOR ANALGESIA Diagnosis:      Scheduled Providers:   Provider:  Fernanda Granados DO    Anesthesia Type:  epidural ASA Status:  2          Anesthesia Type: epidural    Vitals  Vitals Value Taken Time   /56 6/17/2020  9:00 AM   Temp 98.2 °F (36.8 °C) 6/17/2020  9:00 AM   Pulse 65 6/17/2020  9:00 AM   Resp 16 6/17/2020  9:00 AM   SpO2             Post Anesthesia Care and Evaluation    Patient location during evaluation: bedside  Patient participation: complete - patient participated  Level of consciousness: awake  Pain score: 0  Pain management: satisfactory to patient  Airway patency: patent  Anesthetic complications: No anesthetic complications  PONV Status: none  Cardiovascular status: acceptable and hemodynamically stable  Respiratory status: acceptable  Hydration status: acceptable  Post Neuraxial Block status: Motor and sensory function returned to baseline and No signs or symptoms of PDPH     FAMILY HISTORY:  Family history of aneurysm, Mother, ~ 70s, ruptured  Family history of colon cancer, Father & cousin (F)

## 2022-12-27 ENCOUNTER — OFFICE VISIT (OUTPATIENT)
Dept: OBSTETRICS AND GYNECOLOGY | Facility: CLINIC | Age: 32
End: 2022-12-27

## 2022-12-27 VITALS
DIASTOLIC BLOOD PRESSURE: 84 MMHG | HEIGHT: 69 IN | BODY MASS INDEX: 22.13 KG/M2 | WEIGHT: 149.4 LBS | SYSTOLIC BLOOD PRESSURE: 122 MMHG

## 2022-12-27 DIAGNOSIS — Z01.419 ENCOUNTER FOR ANNUAL ROUTINE GYNECOLOGICAL EXAMINATION: Primary | ICD-10-CM

## 2022-12-27 DIAGNOSIS — N92.6 IRREGULAR PERIODS: ICD-10-CM

## 2022-12-27 PROCEDURE — 99213 OFFICE O/P EST LOW 20 MIN: CPT | Performed by: OBSTETRICS & GYNECOLOGY

## 2022-12-27 PROCEDURE — 99395 PREV VISIT EST AGE 18-39: CPT | Performed by: OBSTETRICS & GYNECOLOGY

## 2022-12-27 NOTE — PROGRESS NOTES
Gynecologic Annual Exam Note        Gynecologic Exam (ANNUAL ) and Menstrual Problem        Subjective     HPI  Smita Brown is a 32 y.o.  female who presents for annual well woman exam as a established patient. There were no changes to her medical or surgical history since her last visit.. Patient reports problems with: irregular periods that have been present for about 6-8 months. Patient states she has a regular menses one month and the next month is irregular and is concerned about being perimenopausal. Patient also states her and her  have been trying to conceive for about 2 months.. Patient's last menstrual period was 2022. She reports dysmenorrhea is none. Partner Status: Marital Status: .  She is sexually active. She has not had new partners.. STD testing recommendations have been explained to the patient and she does not desire STD testing.    Additional OB/GYN History   Current contraception: contraceptive methods: None  Desires to: do not start contraception  Thromboembolic Disease: none      History of STD: no    Last Pap : 2020. Results: negative. HPV: negative  Last Completed Pap Smear          PAP SMEAR (Every 3 Years) Next due on 2020  SCANNED - PAP SMEAR                 History of abnormal Pap smear: yes - 2013 in [place] followed by LEEP/colposcopy that was negative  Gardasil status:completed  Family history of uterine, colon, breast, or ovarian cancer: yes - PGM had breast cancer  Performs monthly Self-Breast Exam: yes  Exercises Regularly:yes  Feelings of Anxiety or Depression: no  Tobacco Usage?: No     No current outpatient medications on file.     Patient denies the need for medication refills today.    OB History        2    Para   2    Term   1       1    AB   0    Living   2       SAB   0    IAB   0    Ectopic   0    Molar   0    Multiple   0    Live Births   2                Health Maintenance   Topic Date Due   •  "HEPATITIS C SCREENING  Never done   • ANNUAL PHYSICAL  12/02/2021   • Annual Gynecologic Pelvic and Breast Exam  12/03/2021   • COVID-19 Vaccine (5 - Booster for Pfizer series) 02/04/2022   • PAP SMEAR  12/02/2023   • TDAP/TD VACCINES (2 - Td or Tdap) 10/22/2028   • INFLUENZA VACCINE  Completed   • Pneumococcal Vaccine 0-64  Aged Out       Past Medical History:   Diagnosis Date   • Abnormal Pap smear of cervix     repeat ok   • Migraine    • Thumb fracture     concussions        Past Surgical History:   Procedure Laterality Date   • LEEP  2014   • ORIF FINGER / THUMB FRACTURE     • WISDOM TOOTH EXTRACTION         The additional following portions of the patient's history were reviewed and updated as appropriate: allergies, current medications, past family history, past medical history, past social history, past surgical history and problem list.    Review of Systems   Constitutional: Negative.    Respiratory: Negative.    Cardiovascular: Negative.    Gastrointestinal: Negative.    Genitourinary: Positive for menstrual problem. Negative for breast discharge, breast lump, breast pain and pelvic pain.   Musculoskeletal: Negative.    Neurological: Negative.    Psychiatric/Behavioral: Negative.          I have reviewed and agree with the HPI, ROS, and historical information as entered above. Ashu Molina MD        Objective   /84   Ht 175.3 cm (69.02\")   Wt 67.8 kg (149 lb 6.4 oz)   LMP 11/30/2022   Breastfeeding No   BMI 22.05 kg/m²     Physical Exam  Vitals reviewed. Exam conducted with a chaperone present.   Constitutional:       Appearance: Normal appearance. She is normal weight.   HENT:      Head: Normocephalic and atraumatic.   Cardiovascular:      Rate and Rhythm: Normal rate and regular rhythm.   Pulmonary:      Effort: Pulmonary effort is normal.      Breath sounds: Normal breath sounds.   Chest:   Breasts:     Right: Normal.      Left: Normal.   Abdominal:      General: Abdomen is flat. " Bowel sounds are normal.      Palpations: Abdomen is soft.   Genitourinary:     General: Normal vulva.      Vagina: Normal.      Cervix: Normal.      Uterus: Normal.       Adnexa: Right adnexa normal and left adnexa normal.   Musculoskeletal:      Cervical back: Neck supple.   Skin:     General: Skin is warm and dry.   Neurological:      Mental Status: She is alert and oriented to person, place, and time.   Psychiatric:         Mood and Affect: Mood normal.         Behavior: Behavior normal.            Assessment and Plan    Problem List Items Addressed This Visit    None  Visit Diagnoses     Encounter for annual routine gynecological examination    -  Primary    Irregular periods        Relevant Orders    Follicle Stimulating Hormone    Antimullerian Hormone (AMH)    US Non-ob Transvaginal          1. GYN annual well woman exam.   2. Reviewed pap guidelines.   3. Reviewed monthly self breast exams.  Instructed to call with lumps, pain, or breast discharge.    4. Reviewed exercise as a preventative health measures.   5. Reccommended Flu Vaccine in Fall of each year.  Return in about 2 weeks (around 1/10/2023) for visit and US.      Ashu Molina MD  12/27/2022

## 2022-12-29 ENCOUNTER — TELEPHONE (OUTPATIENT)
Dept: OBSTETRICS AND GYNECOLOGY | Facility: CLINIC | Age: 32
End: 2022-12-29

## 2022-12-29 NOTE — TELEPHONE ENCOUNTER
Pt stated had a message in my chart that her labs were back but un-viewable until released by provider  Pt asked if they could be released

## 2022-12-31 LAB
FSH SERPL-ACNC: 7.7 MIU/ML
MIS SERPL-MCNC: 0.27 NG/ML

## 2023-01-11 ENCOUNTER — OFFICE VISIT (OUTPATIENT)
Dept: OBSTETRICS AND GYNECOLOGY | Facility: CLINIC | Age: 33
End: 2023-01-11
Payer: COMMERCIAL

## 2023-01-11 VITALS
SYSTOLIC BLOOD PRESSURE: 118 MMHG | DIASTOLIC BLOOD PRESSURE: 64 MMHG | WEIGHT: 152.8 LBS | BODY MASS INDEX: 22.63 KG/M2 | HEIGHT: 69 IN

## 2023-01-11 DIAGNOSIS — N92.6 IRREGULAR PERIODS: Primary | ICD-10-CM

## 2023-01-11 PROCEDURE — 99212 OFFICE O/P EST SF 10 MIN: CPT | Performed by: OBSTETRICS & GYNECOLOGY

## 2023-01-11 NOTE — PROGRESS NOTES
Chief Complaint   Patient presents with   • Follow-up     Irregular periods         Subjective   HPI  Smita Brown is a 32 y.o. female, , Patient's last menstrual period was 2023.. She presents for initial evaluation of irregular periods. She reports her cycles occur every 15-28, lasting 4 days. The flow is light to moderate.. The menstrual problem began 8 months ago. Patient was seen a few weeks ago and mentioned periods being irregular. Patient states that periods have always been every 28 days until 8 months ago. In the past the patient has not tried anything due to actively trying to conceive for the past two months. TVUS was done today.     Reviewed prior labs and all questions answered.   Reviewed US today and normal.     Thromboembolic Disease: none  History of hypertension: no  History of migraines: yes with aura  Tobacco Usage?: No     Additional OB/GYN History   Last Pap :   Last Completed Pap Smear          PAP SMEAR (Every 3 Years) Next due on 2020  SCANNED - PAP SMEAR                No current outpatient medications on file.     Past Medical History:   Diagnosis Date   • Abnormal Pap smear of cervix     repeat ok   • Migraine    • Thumb fracture     concussions        Past Surgical History:   Procedure Laterality Date   • LEEP     • ORIF FINGER / THUMB FRACTURE     • WISDOM TOOTH EXTRACTION           The additional following portions of the patient's history were reviewed and updated as appropriate: allergies, current medications, past family history, past medical history, past social history, past surgical history and problem list.    Review of Systems   Constitutional: Negative.    HENT: Negative.    Eyes: Negative.    Respiratory: Negative.    Cardiovascular: Negative.    Gastrointestinal: Negative.    Endocrine: Negative.    Genitourinary: Positive for menstrual problem.   Musculoskeletal: Negative.    Skin: Negative.    Allergic/Immunologic:  "Negative.    Neurological: Negative.    Hematological: Negative.    Psychiatric/Behavioral: Negative.        I have reviewed and agree with the HPI, ROS, and historical information as entered above. Ashu Molina MD    Objective   /64   Ht 175.3 cm (69.02\")   Wt 69.3 kg (152 lb 12.8 oz)   LMP 01/02/2023   BMI 22.55 kg/m²     Physical Exam  Vitals reviewed.   Constitutional:       Appearance: Normal appearance. She is normal weight.   HENT:      Head: Normocephalic and atraumatic.   Skin:     General: Skin is warm and dry.   Neurological:      Mental Status: She is alert and oriented to person, place, and time.   Psychiatric:         Mood and Affect: Mood normal.         Behavior: Behavior normal.         Assessment & Plan     Assessment     Problem List Items Addressed This Visit        Genitourinary and Reproductive     Irregular periods - Primary         Plan     1. Call for heavy bleeding  2. Return PRN  3. Will start on prenatal vitamin and track ovulation. Will reevaluate with having trouble conceiving or anovulatory cycles.       Ashu Molina MD  01/11/2023  "

## 2023-02-28 ENCOUNTER — INITIAL PRENATAL (OUTPATIENT)
Dept: OBSTETRICS AND GYNECOLOGY | Facility: CLINIC | Age: 33
End: 2023-02-28
Payer: COMMERCIAL

## 2023-02-28 VITALS — WEIGHT: 154 LBS | DIASTOLIC BLOOD PRESSURE: 66 MMHG | BODY MASS INDEX: 22.73 KG/M2 | SYSTOLIC BLOOD PRESSURE: 100 MMHG

## 2023-02-28 DIAGNOSIS — Z34.91 PRENATAL CARE IN FIRST TRIMESTER: Primary | ICD-10-CM

## 2023-02-28 PROCEDURE — 0501F PRENATAL FLOW SHEET: CPT | Performed by: OBSTETRICS & GYNECOLOGY

## 2023-02-28 NOTE — PATIENT INSTRUCTIONS
Prenatal Care  Prenatal care is health care during pregnancy. It helps you and your unborn baby (fetus) stay as healthy as possible. Prenatal care may be provided by a midwife, a family practice doctor, a mid-level practitioner (nurse practitioner or physician assistant), or a childbirth and pregnancy doctor (obstetrician).  How does this affect me?  During pregnancy, you will be closely monitored for any new conditions that might develop. To lower your risk of pregnancy complications, you and your health care provider will talk about any underlying conditions you have.  How does this affect my baby?  Early and consistent prenatal care increases the chance that your baby will be healthy during pregnancy. Prenatal care lowers the risk that your baby will be:  Born early (prematurely).  Smaller than expected at birth (small for gestational age).  What can I expect at the first prenatal care visit?  Your first prenatal care visit will likely be the longest. You should schedule your first prenatal care visit as soon as you know that you are pregnant. Your first visit is a good time to talk about any questions or concerns you have about pregnancy.  Medical history  At your visit, you and your health care provider will talk about your medical history, including:  Any past pregnancies.  Your family's medical history.  Medical history of the baby's father.  Any long-term (chronic) health conditions you have and how you manage them.  Any surgeries or procedures you have had.  Any current over-the-counter or prescription medicines, herbs, or supplements that you are taking.  Other factors that could pose a risk to your baby, including:  Exposure to harmful chemicals or radiation at work or at home.  Any substance use, including tobacco, alcohol, and drug use.  Your home setting and your stress levels, including:  Exposure to abuse or violence.  Household financial strain.  Your daily health habits, including diet and  exercise.  Tests and screenings  Your health care provider will:  Measure your weight, height, and blood pressure.  Do a physical exam, including a pelvic and breast exam.  Perform blood tests and urine tests to check for:  Urinary tract infection.  Sexually transmitted infections (STIs).  Low iron levels in your blood (anemia).  Blood type and certain proteins on red blood cells (Rh antibodies).  Infections and immunity to viruses, such as hepatitis B and rubella.  HIV (human immunodeficiency virus).  Discuss your options for genetic screening.  Tips about staying healthy  Your health care provider will also give you information about how to keep yourself and your baby healthy, including:  Nutrition and taking vitamins.  Physical activity.  How to manage pregnancy symptoms such as nausea and vomiting (morning sickness).  Infections and substances that may be harmful to your baby and how to avoid them.  Food safety.  Dental care.  Working.  Travel.  Warning signs to watch for and when to call your health care provider.  How often will I have prenatal care visits?  After your first prenatal care visit, you will have regular visits throughout your pregnancy. The visit schedule is often as follows:  Up to week 28 of pregnancy: once every 4 weeks.  28-36 weeks: once every 2 weeks.  After 36 weeks: every week until delivery.  Some women may have visits more or less often depending on any underlying health conditions and the health of the baby.  Keep all follow-up and prenatal care visits. This is important.  What happens during routine prenatal care visits?  Your health care provider will:  Measure your weight and blood pressure.  Check for fetal heart sounds.  Measure the height of your uterus in your abdomen (fundal height). This may be measured starting around week 20 of pregnancy.  Check the position of your baby inside your uterus.  Ask questions about your diet, sleeping patterns, and whether you can feel the baby  move.  Review warning signs to watch for and signs of labor.  Ask about any pregnancy symptoms you are having and how you are dealing with them. Symptoms may include:  Headaches.  Nausea and vomiting.  Vaginal discharge.  Swelling.  Fatigue.  Constipation.  Changes in your vision.  Feeling persistently sad or anxious.  Any discomfort, including back or pelvic pain.  Bleeding or spotting.  Make a list of questions to ask your health care provider at your routine visits.  What tests might I have during prenatal care visits?  You may have blood, urine, and imaging tests throughout your pregnancy, such as:  Urine tests to check for glucose, protein, or signs of infection.  Glucose tests to check for a form of diabetes that can develop during pregnancy (gestational diabetes mellitus). This is usually done around week 24 of pregnancy.  Ultrasounds to check your baby's growth and development, to check for birth defects, and to check your baby's well-being. These can also help to decide when you should deliver your baby.  A test to check for group B strep (GBS) infection. This is usually done around week 36 of pregnancy.  Genetic testing. This may include blood, fluid, or tissue sampling, or imaging tests, such as an ultrasound. Some genetic tests are done during the first trimester and some are done during the second trimester.  What else can I expect during prenatal care visits?  Your health care provider may recommend getting certain vaccines during pregnancy. These may include:  A yearly flu shot (annual influenza vaccine). This is especially important if you will be pregnant during flu season.  Tdap (tetanus, diphtheria, pertussis) vaccine. Getting this vaccine during pregnancy can protect your baby from whooping cough (pertussis) after birth. This vaccine may be recommended between weeks 27 and 36 of pregnancy.  A COVID-19 vaccine.  Later in your pregnancy, your health care provider may give you information  about:  Childbirth and breastfeeding classes.  Choosing a health care provider for your baby.  Umbilical cord banking.  Breastfeeding.  Birth control after your baby is born.  The hospital labor and delivery unit and how to set up a tour.  Registering at the hospital before you go into labor.  Where to find more information  Office on Women's Health: womenshealth.gov  American Pregnancy Association: americanpregnancy.org  March of Dimes: marchofdimes.org  Summary  Prenatal care helps you and your baby stay as healthy as possible during pregnancy.  Your first prenatal care visit will most likely be the longest.  You will have visits and tests throughout your pregnancy to monitor your health and your baby's health.  Bring a list of questions to your visits to ask your health care provider.  Make sure to keep all follow-up and prenatal care visits.  This information is not intended to replace advice given to you by your health care provider. Make sure you discuss any questions you have with your health care provider.  Document Revised: 09/30/2021 Document Reviewed: 09/30/2021  [employer] Patient Education © 2022 [employer]

## 2023-02-28 NOTE — PROGRESS NOTES
Initial ob visit     CC- Here for care of pregnancy        Smita Brown is a 32 y.o. female, , who presents for her first obstetrical visit.  Her last LMP was Patient's last menstrual period was 2023.. Her ROCIO is 10/1/2023, by Ultrasound. Current GA is 9w2d.     Initial positive test date : 2023, UPT        Her periods are: every 16-30 days approximately  Prior obstetric issues: none  Patient's past medical history is significant for: previous LEEP in 2014? Per pt. that resulted in normal  Family history of genetic issues (includes FOB): none  Prior infections concerning in pregnancy (Rash, fever in last 2 weeks): No  Varicella Hx - history of chicken pox  Prior testing for Cystic Fibrosis Carrier or Sickle Cell Trait- yes in 2018 that resulted in normal  Prepregnancy BMI -   History of STD: no  Hx of HSV for patient or partner: no  Ultrasound Today: yes    OB History    Para Term  AB Living   3 2 1 1 0 2   SAB IAB Ectopic Molar Multiple Live Births   0 0 0 0 0 2      # Outcome Date GA Lbr Nando/2nd Weight Sex Delivery Anes PTL Lv   3 Current            2  20 36w3d  3045 g (6 lb 11.4 oz) M Vag-Spont EPI N CHARLENE   1 Term 19 40w0d  3232 g (7 lb 2 oz) F Vag-Spont   CHARLENE       Additional Pertinent History   Last Pap : 2020 Result: negative HPV: negative     Last Completed Pap Smear          PAP SMEAR (Every 3 Years) Next due on 2020  SCANNED - PAP SMEAR              History of abnormal Pap smear: yes - 10 years ago approximately that was ASCUS per pt followed by LEEP that was normal. All repeat paps have been normal since per pt.  Family history of uterine, colon, breast, or ovarian cancer: yes - PGM had breast cancer  Feelings of Anxiety or Depression: no  Tobacco Usage?: No   Alcohol/Drug Use?: NO  Over the age of 35 at delivery: yes  Desires Genetic Screening: Cell Free DNA without gender    PMH    Current Outpatient Medications:   •   Prenatal Vit-Fe Fumarate-FA (PRENATAL VITAMINS PO), Take  by mouth., Disp: , Rfl:      Past Medical History:   Diagnosis Date   • Abnormal Pap smear of cervix     repeat ok   • Migraine    • Thumb fracture     concussions        Past Surgical History:   Procedure Laterality Date   • LEEP  2014   • ORIF FINGER / THUMB FRACTURE     • WISDOM TOOTH EXTRACTION         Review of Systems   Review of Systems  Patient Reports: Nausea and vomiting  Patient Denies: Spotting, Heavy bleeding, Cramping and Fatigue  All systems reviewed and otherwise normal.    I have reviewed and agree with the HPI, ROS, and historical information as entered above. Ashu Molina MD    /66   Wt 69.9 kg (154 lb)   LMP 01/02/2023   BMI 22.73 kg/m²     The additional following portions of the patient's history were reviewed and updated as appropriate: allergies, current medications, past family history, past medical history, past social history, past surgical history and problem list.    Physical Exam  General:  well developed; well nourished  no acute distress   Chest/Respiratory: No labored breathing, normal respiratory effort, normal appearance, no respiratory noises noted   Heart:  normal rate, regular rhythm,  no murmurs, rubs, or gallops   Thyroid: normal to inspection and palpation   Breasts:  Not performed.   Abdomen: soft, non-tender; no masses  no umbilical or inguinal hernias are present  no hepato-splenomegaly   Pelvis: Not performed.        Assessment and Plan    Problem List Items Addressed This Visit    None  Visit Diagnoses     Prenatal care in first trimester    -  Primary    Relevant Orders    Obstetric Panel    Chlamydia trachomatis, Neisseria gonorrhoeae, PCR w/ confirmation - Urine, Urine, Clean Catch    HIV-1 / O / 2 Ag / Antibody 4th Generation    Urinalysis With Microscopic - Urine, Clean Catch    Urine Culture - Urine, Urine, Clean Catch    Urine Drug Screen - Urine, Clean Catch    [id number] PLUS Core + ESS  + SCA, NO Gender          1. Pregnancy at 9w2d  2. Reviewed routine prenatal care with the office and educational materials given  3. Lab(s) Ordered  4. Discussed options for genetic testing including first trimester nuchal translucency screen, genetic disease carrier testing, quadruple screen, and NIPT  5. Nausea/Vomiting - she does not desire medications at this time.  Discussed conservative ways to help with nausea.  6. Patient is on Prenatal vitamins  7. discussed baby aspirin from 10-36 weeks for prevention of preeclampsia   Return in about 4 weeks (around 3/28/2023) for Routine prenatal care.      Ashu Molina MD  02/28/2023

## 2023-03-05 LAB
ABO GROUP BLD: NORMAL
AMPHETAMINES UR QL SCN: NEGATIVE NG/ML
APPEARANCE UR: CLEAR
BACTERIA #/AREA URNS HPF: NORMAL /[HPF]
BACTERIA UR CULT: ABNORMAL
BACTERIA UR CULT: ABNORMAL
BARBITURATES UR QL SCN: NEGATIVE NG/ML
BASOPHILS # BLD AUTO: 0 X10E3/UL (ref 0–0.2)
BASOPHILS NFR BLD AUTO: 0 %
BENZODIAZ UR QL SCN: NEGATIVE NG/ML
BILIRUB UR QL STRIP: NEGATIVE
BLD GP AB SCN SERPL QL: NEGATIVE
BZE UR QL SCN: NEGATIVE NG/ML
C TRACH RRNA SPEC QL NAA+PROBE: NEGATIVE
CANNABINOIDS UR QL SCN: NEGATIVE NG/ML
CASTS URNS QL MICRO: NORMAL /LPF
COLOR UR: YELLOW
CREAT UR-MCNC: 170.9 MG/DL (ref 20–300)
EOSINOPHIL # BLD AUTO: 0 X10E3/UL (ref 0–0.4)
EOSINOPHIL NFR BLD AUTO: 0 %
EPI CELLS #/AREA URNS HPF: NORMAL /HPF (ref 0–10)
ERYTHROCYTE [DISTWIDTH] IN BLOOD BY AUTOMATED COUNT: 12.2 % (ref 11.7–15.4)
GLUCOSE UR QL STRIP: NEGATIVE
HBV SURFACE AG SERPL QL IA: NEGATIVE
HCT VFR BLD AUTO: 36.5 % (ref 34–46.6)
HCV IGG SERPL QL IA: NON REACTIVE
HGB BLD-MCNC: 12.3 G/DL (ref 11.1–15.9)
HGB UR QL STRIP: NEGATIVE
HIV 1+2 AB+HIV1 P24 AG SERPL QL IA: NON REACTIVE
IMM GRANULOCYTES # BLD AUTO: 0 X10E3/UL (ref 0–0.1)
IMM GRANULOCYTES NFR BLD AUTO: 0 %
KETONES UR QL STRIP: NEGATIVE
LABORATORY COMMENT REPORT: NORMAL
LEUKOCYTE ESTERASE UR QL STRIP: NEGATIVE
LYMPHOCYTES # BLD AUTO: 1.2 X10E3/UL (ref 0.7–3.1)
LYMPHOCYTES NFR BLD AUTO: 20 %
MCH RBC QN AUTO: 31.7 PG (ref 26.6–33)
MCHC RBC AUTO-ENTMCNC: 33.7 G/DL (ref 31.5–35.7)
MCV RBC AUTO: 94 FL (ref 79–97)
METHADONE UR QL SCN: NEGATIVE NG/ML
MICRO URNS: NORMAL
MICRO URNS: NORMAL
MONOCYTES # BLD AUTO: 0.4 X10E3/UL (ref 0.1–0.9)
MONOCYTES NFR BLD AUTO: 7 %
N GONORRHOEA RRNA SPEC QL NAA+PROBE: NEGATIVE
NEUTROPHILS # BLD AUTO: 4.3 X10E3/UL (ref 1.4–7)
NEUTROPHILS NFR BLD AUTO: 73 %
NITRITE UR QL STRIP: NEGATIVE
OPIATES UR QL SCN: NEGATIVE NG/ML
OTHER ANTIBIOTIC SUSC ISLT: ABNORMAL
OXYCODONE+OXYMORPHONE UR QL SCN: NEGATIVE NG/ML
PCP UR QL: NEGATIVE NG/ML
PH UR STRIP: 6.5 [PH] (ref 5–7.5)
PH UR: 6.7 [PH] (ref 4.5–8.9)
PLATELET # BLD AUTO: 262 X10E3/UL (ref 150–450)
PROPOXYPH UR QL SCN: NEGATIVE NG/ML
PROT UR QL STRIP: NEGATIVE
RBC # BLD AUTO: 3.88 X10E6/UL (ref 3.77–5.28)
RBC #/AREA URNS HPF: NORMAL /HPF (ref 0–2)
RH BLD: POSITIVE
RPR SER QL: NON REACTIVE
RUBV IGG SERPL IA-ACNC: 3.82 INDEX
SP GR UR STRIP: 1.02 (ref 1–1.03)
UROBILINOGEN UR STRIP-MCNC: 0.2 MG/DL (ref 0.2–1)
WBC # BLD AUTO: 6 X10E3/UL (ref 3.4–10.8)
WBC #/AREA URNS HPF: NORMAL /HPF (ref 0–5)

## 2023-03-06 RX ORDER — NITROFURANTOIN 25; 75 MG/1; MG/1
100 CAPSULE ORAL 2 TIMES DAILY
Qty: 14 CAPSULE | Refills: 0 | Status: SHIPPED | OUTPATIENT
Start: 2023-03-06 | End: 2023-03-13

## 2023-03-28 ENCOUNTER — ROUTINE PRENATAL (OUTPATIENT)
Dept: OBSTETRICS AND GYNECOLOGY | Facility: CLINIC | Age: 33
End: 2023-03-28
Payer: COMMERCIAL

## 2023-03-28 VITALS — BODY MASS INDEX: 23.23 KG/M2 | WEIGHT: 157.4 LBS | SYSTOLIC BLOOD PRESSURE: 100 MMHG | DIASTOLIC BLOOD PRESSURE: 60 MMHG

## 2023-03-28 DIAGNOSIS — Z34.92 PRENATAL CARE IN SECOND TRIMESTER: Primary | ICD-10-CM

## 2023-03-28 DIAGNOSIS — Z82.79 FAMILY HISTORY OF CONGENITAL HEART DEFECT: ICD-10-CM

## 2023-03-28 LAB
GLUCOSE UR STRIP-MCNC: NEGATIVE MG/DL
PROT UR STRIP-MCNC: NEGATIVE MG/DL

## 2023-03-28 NOTE — PROGRESS NOTES
OB FOLLOW UP  CC- Here for care of pregnancy        Smita Brown is a 32 y.o.  13w2d patient being seen today for her obstetrical follow up visit. Patient reports no complaints..     Her prenatal care is complicated by (and status) : None  Patient Active Problem List   Diagnosis   • Family history of congenital heart defect   • Family history of genetic disease   • Pregnancy   • Irregular periods       Desires genetic testing?: done on 2023, normal and gender is female  Ultrasound Today: No    ROS -   Patient Reports : No Problems  Patient Denies: Loss of Fluid, Vaginal Spotting, Vision Changes, Headaches, Nausea  and Vomiting   Fetal Movement : not yet  All other systems reviewed and are negative.     The additional following portions of the patient's history were reviewed and updated as appropriate: allergies, current medications, past family history, past medical history, past social history, past surgical history and problem list.    I have reviewed and agree with the HPI, ROS, and historical information as entered above. Ashu Molina MD    /60   Wt 71.4 kg (157 lb 6.4 oz)   LMP 2023   BMI 23.23 kg/m²         EXAM:     Prenatal Vitals  BP: 100/60  Weight: 71.4 kg (157 lb 6.4 oz)              Urine Glucose Read-only: Negative  Urine Protein Read-only: Negative       Assessment and Plan    Problem List Items Addressed This Visit        Family History    Family history of congenital heart defect   Other Visit Diagnoses     Prenatal care in second trimester    -  Primary    Relevant Orders    POC Urinalysis Dipstick (Completed)    [facility]          1. Pregnancy at 13w2d  2. Labs reviewed from New OB Visit.  3. Counseled on genetic testing, carrier status and option for NT screen  4. Activity and Exercise discussed.  5. Patient is on Prenatal vitamins  6. Referral to [facility] Ordered due to prior child with heart defect  Return in about 3 weeks  (around 4/18/2023) for Routine prenatal care. [facility] referral placed for anatomy scan in 6 weeks.    Ashu Molina MD  03/28/2023

## 2023-04-18 ENCOUNTER — ROUTINE PRENATAL (OUTPATIENT)
Dept: OBSTETRICS AND GYNECOLOGY | Facility: CLINIC | Age: 33
End: 2023-04-18
Payer: COMMERCIAL

## 2023-04-18 VITALS — DIASTOLIC BLOOD PRESSURE: 60 MMHG | WEIGHT: 160.4 LBS | BODY MASS INDEX: 23.67 KG/M2 | SYSTOLIC BLOOD PRESSURE: 100 MMHG

## 2023-04-18 DIAGNOSIS — O09.40 ENCOUNTER FOR SUPERVISION OF HIGH RISK PREGNANCY WITH GRAND MULTIPARITY, ANTEPARTUM: Primary | ICD-10-CM

## 2023-04-18 DIAGNOSIS — O09.529 ANTEPARTUM MULTIGRAVIDA OF ADVANCED MATERNAL AGE: ICD-10-CM

## 2023-04-18 DIAGNOSIS — O23.40 UTI IN PREGNANCY, ANTEPARTUM: ICD-10-CM

## 2023-04-18 LAB
GLUCOSE UR STRIP-MCNC: NEGATIVE MG/DL
PROT UR STRIP-MCNC: NEGATIVE MG/DL

## 2023-04-18 PROCEDURE — 81002 URINALYSIS NONAUTO W/O SCOPE: CPT | Performed by: OBSTETRICS & GYNECOLOGY

## 2023-04-18 PROCEDURE — 0502F SUBSEQUENT PRENATAL CARE: CPT | Performed by: OBSTETRICS & GYNECOLOGY

## 2023-04-18 RX ORDER — ALBUTEROL SULFATE 90 UG/1
AEROSOL, METERED RESPIRATORY (INHALATION)
COMMUNITY

## 2023-04-18 NOTE — PROGRESS NOTES
"      OB FOLLOW UP  CC- Here for care of pregnancy        Smita Brown is a 32 y.o.  16w2d patient being seen today for her obstetrical follow up visit. Patient reports no complaints. [facility] anatomy u/s next visit.     Her prenatal care is complicated by (and status) :   Patient Active Problem List   Diagnosis   • Family history of congenital heart defect   • Family history of genetic disease   • Pregnancy   • Irregular periods         Ultrasound Today: No    AFP: desires, cfdna neg/girl    ROS -   Patient Denies: Loss of Fluid, Vaginal Spotting, Vision Changes, Headaches, Nausea , Vomiting  and Contractions  Fetal Movement : \"feels flutters\"   All other systems reviewed and are negative.       The additional following portions of the patient's history were reviewed and updated as appropriate: allergies, current medications, past family history, past medical history, past social history, past surgical history and problem list.    I have reviewed and agree with the HPI, ROS, and historical information as entered above. Ashu Molina MD        EXAM:     Prenatal Vitals  BP: 100/60  Weight: 72.8 kg (160 lb 6.4 oz)       Pelvic Exam:        Urine Glucose Read-only: Negative  Urine Protein Read-only: Negative           Assessment and Plan    Problem List Items Addressed This Visit    None  Visit Diagnoses     Encounter for supervision of high risk pregnancy with grand multiparity, antepartum    -  Primary    Relevant Orders    POC Urinalysis Dipstick (Completed)    Alpha Fetoprotein, Maternal    Urine Culture - Urine, Urine, Clean Catch    Antepartum multigravida of advanced maternal age        Relevant Orders    Alpha Fetoprotein, Maternal    Urine Culture - Urine, Urine, Clean Catch    UTI in pregnancy, antepartum        Relevant Orders    Urine Culture - Urine, Urine, Clean Catch          1. Pregnancy at 16w2d  2. Fetal status reassuring.   3. Counseled on MSAFP alone in relation to OTD and placental " issues.    4. Anatomy scan next visit.   5. Activity and Exercise discussed.  6. U/S ordered at follow up  7. Patient is on Prenatal vitamins  8. Referral to [facility] Ordered for anatomy scan  Return in about 3 weeks (around 5/9/2023) for Routine prenatal care after [facility] scan.    Ashu Molina MD  04/18/2023

## 2023-04-20 LAB
AFP INTERP SERPL-IMP: NORMAL
AFP INTERP SERPL-IMP: NORMAL
AFP MOM SERPL: 0.8
AFP SERPL-MCNC: 27.4 NG/ML
AGE AT DELIVERY: 35.2 YR
BACTERIA UR CULT: NORMAL
BACTERIA UR CULT: NORMAL
GA METHOD: NORMAL
GA: 16.3 WEEKS
IDDM PATIENT QL: NO
LABORATORY COMMENT REPORT: NORMAL
MULTIPLE PREGNANCY: NO
NEURAL TUBE DEFECT RISK FETUS: NORMAL %
RESULT: NORMAL

## 2023-05-09 ENCOUNTER — HOSPITAL ENCOUNTER (OUTPATIENT)
Dept: WOMENS IMAGING | Facility: HOSPITAL | Age: 33
Discharge: HOME | End: 2023-05-09
Admitting: OBSTETRICS & GYNECOLOGY
Payer: COMMERCIAL

## 2023-05-09 ENCOUNTER — OFFICE VISIT (OUTPATIENT)
Dept: OBSTETRICS AND GYNECOLOGY | Facility: HOSPITAL | Age: 33
End: 2023-05-09
Payer: COMMERCIAL

## 2023-05-09 ENCOUNTER — ROUTINE PRENATAL (OUTPATIENT)
Dept: OBSTETRICS AND GYNECOLOGY | Facility: CLINIC | Age: 33
End: 2023-05-09
Payer: COMMERCIAL

## 2023-05-09 VITALS — BODY MASS INDEX: 24.15 KG/M2 | DIASTOLIC BLOOD PRESSURE: 58 MMHG | SYSTOLIC BLOOD PRESSURE: 100 MMHG | WEIGHT: 163.6 LBS

## 2023-05-09 VITALS — BODY MASS INDEX: 24.23 KG/M2 | SYSTOLIC BLOOD PRESSURE: 108 MMHG | DIASTOLIC BLOOD PRESSURE: 64 MMHG | WEIGHT: 164.2 LBS

## 2023-05-09 DIAGNOSIS — Z82.79 FAMILY HISTORY OF CONGENITAL HEART DEFECT: Primary | ICD-10-CM

## 2023-05-09 DIAGNOSIS — Z34.92 PRENATAL CARE IN SECOND TRIMESTER: ICD-10-CM

## 2023-05-09 DIAGNOSIS — Z82.79 FAMILY HISTORY OF CONGENITAL HEART DEFECT: ICD-10-CM

## 2023-05-09 DIAGNOSIS — Z34.92 PRENATAL CARE IN SECOND TRIMESTER: Primary | ICD-10-CM

## 2023-05-09 DIAGNOSIS — Z34.90 PREGNANCY, UNSPECIFIED GESTATIONAL AGE: ICD-10-CM

## 2023-05-09 LAB
EXPIRATION DATE: 0
GLUCOSE UR STRIP-MCNC: NEGATIVE MG/DL
Lab: 0
PROT UR STRIP-MCNC: NEGATIVE MG/DL

## 2023-05-09 PROCEDURE — 76811 OB US DETAILED SNGL FETUS: CPT

## 2023-05-09 NOTE — PROGRESS NOTES
OB FOLLOW UP  CC- Here for care of pregnancy        Smita Brown is a 32 y.o.  19w2d patient being seen today for her obstetrical follow up visit. Patient reports no complaints..   US with [facility] today. Reviewed and discussed.     Her prenatal care is complicated by (and status) :   Patient Active Problem List   Diagnosis   • Family history of congenital heart defect   • Family history of genetic disease   • Pregnancy   • Irregular periods       Ultrasound Today: Yes. Anatomy done at [facility]    ROS -   Patient Reports : No Problems  Patient Denies: Loss of Fluid, Vaginal Spotting, Vision Changes, Headaches, Nausea , Vomiting , Contractions and Epigastric pain  Fetal Movement : normal  All other systems reviewed and are negative.       The additional following portions of the patient's history were reviewed and updated as appropriate: allergies and current medications.    I have reviewed and agree with the HPI, ROS, and historical information as entered above. Ashu Molina MD    /64   Wt 74.5 kg (164 lb 3.2 oz)   LMP 2023   BMI 24.23 kg/m²       EXAM:     Prenatal Vitals  BP: 108/64  Weight: 74.5 kg (164 lb 3.2 oz)              Urine Glucose Read-only: Negative  Urine Protein Read-only: Negative       Assessment and Plan    Problem List Items Addressed This Visit        Family History    Family history of congenital heart defect   Other Visit Diagnoses     Prenatal care in second trimester    -  Primary    Relevant Orders    POC Glucose, Urine, Qualitative, Dipstick (Completed)    POC Protein, Urine, Qualitative, Dipstick (Completed)          1. Pregnancy at 19w2d  2. Anatomy scan today is incomplete, follow up in 4 weeks for additional views. Anatomy that was visualized was within normal limits.  3. Fetal status reassuring.   4. Activity and Exercise discussed.  5. Patient is on Prenatal vitamins  Return in about 4 weeks (around 2023) for Routine prenatal care after [facility]  scan.    Ashu Molina MD  05/09/2023

## 2023-06-06 ENCOUNTER — ROUTINE PRENATAL (OUTPATIENT)
Dept: OBSTETRICS AND GYNECOLOGY | Facility: CLINIC | Age: 33
End: 2023-06-06
Payer: COMMERCIAL

## 2023-06-06 ENCOUNTER — HOSPITAL ENCOUNTER (OUTPATIENT)
Dept: WOMENS IMAGING | Facility: HOSPITAL | Age: 33
Discharge: HOME | End: 2023-06-06
Admitting: OBSTETRICS & GYNECOLOGY
Payer: COMMERCIAL

## 2023-06-06 ENCOUNTER — OFFICE VISIT (OUTPATIENT)
Dept: OBSTETRICS AND GYNECOLOGY | Facility: HOSPITAL | Age: 33
End: 2023-06-06
Payer: COMMERCIAL

## 2023-06-06 VITALS — WEIGHT: 173 LBS | BODY MASS INDEX: 25.53 KG/M2 | SYSTOLIC BLOOD PRESSURE: 105 MMHG | DIASTOLIC BLOOD PRESSURE: 61 MMHG

## 2023-06-06 VITALS — BODY MASS INDEX: 25.5 KG/M2 | WEIGHT: 172.8 LBS | SYSTOLIC BLOOD PRESSURE: 108 MMHG | DIASTOLIC BLOOD PRESSURE: 66 MMHG

## 2023-06-06 DIAGNOSIS — Z82.79 FAMILY HISTORY OF CONGENITAL HEART DEFECT: Primary | ICD-10-CM

## 2023-06-06 DIAGNOSIS — O09.522 MULTIGRAVIDA OF ADVANCED MATERNAL AGE IN SECOND TRIMESTER: ICD-10-CM

## 2023-06-06 DIAGNOSIS — Z34.90 PREGNANCY, UNSPECIFIED GESTATIONAL AGE: ICD-10-CM

## 2023-06-06 DIAGNOSIS — Z34.92 PRENATAL CARE IN SECOND TRIMESTER: Primary | ICD-10-CM

## 2023-06-06 DIAGNOSIS — Z82.79 FAMILY HISTORY OF CONGENITAL HEART DEFECT: ICD-10-CM

## 2023-06-06 LAB
GLUCOSE UR STRIP-MCNC: NEGATIVE MG/DL
PROT UR STRIP-MCNC: NEGATIVE MG/DL

## 2023-06-06 PROCEDURE — 76825 ECHO EXAM OF FETAL HEART: CPT

## 2023-06-06 PROCEDURE — 76825 ECHO EXAM OF FETAL HEART: CPT | Performed by: OBSTETRICS & GYNECOLOGY

## 2023-06-06 PROCEDURE — 76816 OB US FOLLOW-UP PER FETUS: CPT

## 2023-06-06 PROCEDURE — 93325 DOPPLER ECHO COLOR FLOW MAPG: CPT | Performed by: OBSTETRICS & GYNECOLOGY

## 2023-06-06 PROCEDURE — 0502F SUBSEQUENT PRENATAL CARE: CPT | Performed by: OBSTETRICS & GYNECOLOGY

## 2023-06-06 PROCEDURE — 76816 OB US FOLLOW-UP PER FETUS: CPT | Performed by: OBSTETRICS & GYNECOLOGY

## 2023-06-06 PROCEDURE — 93325 DOPPLER ECHO COLOR FLOW MAPG: CPT

## 2023-06-06 NOTE — PROGRESS NOTES
"    OB FOLLOW UP  CC- Here for care of pregnancy        Smita Brown is a 32 y.o.  23w2d patient being seen today for her obstetrical follow up visit. Patient reports no complaints..     Her prenatal care is complicated by (and status) : AMA  Patient Active Problem List   Diagnosis    Family history of congenital heart defect    Family history of genetic disease    Pregnancy    Irregular periods    Multigravida of advanced maternal age in second trimester       Ultrasound Today: Yes at [facility]. Patient states \"everything looked good\" and they advised her to follow up at 32 weeks for growth U/S, which is scheduled on 2023    ROS -   Patient Reports : No Problems  Patient Denies: Loss of Fluid, Vaginal Spotting, Vision Changes, Headaches, Nausea , Vomiting , Contractions, and Epigastric pain  Fetal Movement : normal  All other systems reviewed and are negative.       The additional following portions of the patient's history were reviewed and updated as appropriate: allergies, current medications, past family history, past medical history, past social history, past surgical history, and problem list.      I have reviewed and agree with the HPI, ROS, and historical information as entered above. Ashu Molina MD      /66   Wt 78.4 kg (172 lb 12.8 oz)   LMP 2023   BMI 25.50 kg/m²       EXAM:     Prenatal Vitals  BP: 108/66  Weight: 78.4 kg (172 lb 12.8 oz)                   Urine Glucose Read-only: Negative  Urine Protein Read-only: Negative       Assessment and Plan    Problem List Items Addressed This Visit          Gravid and     Multigravida of advanced maternal age in second trimester     Other Visit Diagnoses       Prenatal care in second trimester    -  Primary    Relevant Orders    POC Urinalysis Dipstick (Completed)            Pregnancy at 23w2d  Fetal status reassuring.  [facility] scan reviewed today.  1 hour gtt, CBC, Antibody screen, and TDAP next visit. Instructions " given  Fetal movement/PTL or Labor precautions  Reviewed routine prenatal care with the office and educational materials given  Discussed/encouraged TDAP vaccination after 28 weeks  Reviewed Pre-eclampsia signs/symptoms  Discussed bASA for PIH prevention from 12 to 36wk  Activity and Exercise discussed.  Return in about 4 weeks (around 7/4/2023) for Routine prenatal care.    Ashu Molina MD  06/06/2023

## 2023-06-06 NOTE — PROGRESS NOTES
Patient seen in Maternal Fetal Medicine clinic today. Please see full note in under imaging tab of patient chart in Epic (Viewpoint report).    Patricia Holt MD

## 2023-08-02 ENCOUNTER — ROUTINE PRENATAL (OUTPATIENT)
Dept: OBSTETRICS AND GYNECOLOGY | Facility: CLINIC | Age: 33
End: 2023-08-02
Payer: COMMERCIAL

## 2023-08-02 VITALS — BODY MASS INDEX: 27.63 KG/M2 | WEIGHT: 187.2 LBS | DIASTOLIC BLOOD PRESSURE: 68 MMHG | SYSTOLIC BLOOD PRESSURE: 100 MMHG

## 2023-08-02 DIAGNOSIS — Z3A.31 31 WEEKS GESTATION OF PREGNANCY: ICD-10-CM

## 2023-08-02 DIAGNOSIS — Z82.79 FAMILY HISTORY OF CONGENITAL HEART DEFECT: Primary | ICD-10-CM

## 2023-08-02 DIAGNOSIS — Z84.89 FAMILY HISTORY OF GENETIC DISEASE: ICD-10-CM

## 2023-08-02 DIAGNOSIS — Z34.93 THIRD TRIMESTER PREGNANCY: Primary | ICD-10-CM

## 2023-08-02 DIAGNOSIS — O09.523 MULTIGRAVIDA OF ADVANCED MATERNAL AGE IN THIRD TRIMESTER: ICD-10-CM

## 2023-08-02 DIAGNOSIS — O09.522 MULTIGRAVIDA OF ADVANCED MATERNAL AGE IN SECOND TRIMESTER: ICD-10-CM

## 2023-08-16 ENCOUNTER — ROUTINE PRENATAL (OUTPATIENT)
Dept: OBSTETRICS AND GYNECOLOGY | Facility: CLINIC | Age: 33
End: 2023-08-16
Payer: COMMERCIAL

## 2023-08-16 VITALS — WEIGHT: 189.8 LBS | BODY MASS INDEX: 28.01 KG/M2 | SYSTOLIC BLOOD PRESSURE: 104 MMHG | DIASTOLIC BLOOD PRESSURE: 64 MMHG

## 2023-08-16 DIAGNOSIS — O09.522 MULTIGRAVIDA OF ADVANCED MATERNAL AGE IN SECOND TRIMESTER: ICD-10-CM

## 2023-08-16 DIAGNOSIS — Z34.90 PRENATAL CARE, ANTEPARTUM: Primary | ICD-10-CM

## 2023-08-16 LAB
GLUCOSE UR STRIP-MCNC: NEGATIVE MG/DL
PROT UR STRIP-MCNC: NEGATIVE MG/DL

## 2023-08-16 NOTE — PROGRESS NOTES
OB FOLLOW UP  CC- Here for care of pregnancy        Smita Brown is a 33 y.o.  33w3d patient being seen today for her obstetrical follow up visit. Patient reports occasional BH contractions.     Her prenatal care is complicated by (and status) :   Patient Active Problem List   Diagnosis    Family history of congenital heart defect    Family history of genetic disease    Pregnancy    Irregular periods    Multigravida of advanced maternal age in second trimester       Ultrasound Today: No  Non Stress Test: No.    ROS -   Patient Denies: Loss of Fluid, Vaginal Spotting, Vision Changes, Headaches, Nausea , Vomiting , and Epigastric pain  Fetal Movement : normal  All other systems reviewed and are negative.       The additional following portions of the patient's history were reviewed and updated as appropriate: allergies, current medications, past family history, past medical history, past social history, past surgical history, and problem list.    I have reviewed and agree with the HPI, ROS, and historical information as entered above. Ashu Molina MD      /64   Wt 86.1 kg (189 lb 12.8 oz)   LMP 2023   BMI 28.01 kg/mý       EXAM:     Prenatal Vitals  BP: 104/64  Weight: 86.1 kg (189 lb 12.8 oz)                   Urine Glucose Read-only: Negative  Urine Protein Read-only: Negative           Assessment and Plan    Problem List Items Addressed This Visit          Gravid and     Multigravida of advanced maternal age in second trimester     Other Visit Diagnoses       Prenatal care, antepartum    -  Primary    Relevant Orders    POC Urinalysis Dipstick (Completed)            Pregnancy at 33w3d  Fetal status reassuring.   Activity and Exercise discussed.  Fetal movement/PTL or Labor precautions  Reviewed Pre-eclampsia signs/symptoms  GBS next visit  Return in about 2 weeks (around 2023) for Routine prenatal care for GBS.    Ashu Molina MD  2023

## 2023-08-30 ENCOUNTER — ROUTINE PRENATAL (OUTPATIENT)
Dept: OBSTETRICS AND GYNECOLOGY | Facility: CLINIC | Age: 33
End: 2023-08-30
Payer: COMMERCIAL

## 2023-08-30 ENCOUNTER — LAB (OUTPATIENT)
Dept: LAB | Facility: HOSPITAL | Age: 33
End: 2023-08-30
Payer: COMMERCIAL

## 2023-08-30 VITALS — SYSTOLIC BLOOD PRESSURE: 120 MMHG | WEIGHT: 191.2 LBS | BODY MASS INDEX: 28.22 KG/M2 | DIASTOLIC BLOOD PRESSURE: 76 MMHG

## 2023-08-30 DIAGNOSIS — Z34.93 THIRD TRIMESTER PREGNANCY: Primary | ICD-10-CM

## 2023-08-30 DIAGNOSIS — Z34.93 PRENATAL CARE IN THIRD TRIMESTER: Primary | ICD-10-CM

## 2023-08-30 DIAGNOSIS — O09.522 MULTIGRAVIDA OF ADVANCED MATERNAL AGE IN SECOND TRIMESTER: ICD-10-CM

## 2023-08-30 PROCEDURE — 0502F SUBSEQUENT PRENATAL CARE: CPT | Performed by: OBSTETRICS & GYNECOLOGY

## 2023-08-30 PROCEDURE — 87081 CULTURE SCREEN ONLY: CPT

## 2023-08-30 NOTE — PROGRESS NOTES
OB FOLLOW UP  CC- Here for care of pregnancy        Smita Brown is a 33 y.o.  35w3d patient being seen today for her obstetrical follow up visit. Patient reports no complaints..     Her prenatal care is complicated by (and status) :     Patient Active Problem List   Diagnosis    Family history of congenital heart defect    Family history of genetic disease    Pregnancy    Irregular periods    Multigravida of advanced maternal age in second trimester       Ultrasound Today: No  Non Stress Test: No.    ROS -   Patient Reports : No Problems  Patient Denies: Loss of Fluid, Vaginal Spotting, Vision Changes, Headaches, Nausea , Vomiting , Contractions, and Epigastric pain  Fetal Movement : normal  All other systems reviewed and are negative.       The additional following portions of the patient's history were reviewed and updated as appropriate: allergies, current medications, past family history, past medical history, past social history, past surgical history, and problem list.    I have reviewed and agree with the HPI, ROS, and historical information as entered above. Ashu Molina MD      /76   Wt 86.7 kg (191 lb 3.2 oz)   LMP 2023   BMI 28.22 kg/mý       EXAM:     Prenatal Vitals  BP: 120/76  Weight: 86.7 kg (191 lb 3.2 oz)                               Assessment and Plan    Problem List Items Addressed This Visit    None  Visit Diagnoses       Prenatal care in third trimester    -  Primary    Relevant Orders    Group B Streptococcus Culture - Swab, Vaginal/Rectum            Pregnancy at 35w3d  Fetal status reassuring.   Activity and Exercise discussed.  Fetal movement/PTL or Labor precautions  Reviewed Pre-eclampsia signs/symptoms  GBS next visit  Return in about 1 week (around 2023) for Routine prenatal care.    Ashu Molina MD  2023

## 2023-09-02 LAB — BACTERIA SPEC AEROBE CULT: NORMAL

## 2023-09-05 ENCOUNTER — PREP FOR SURGERY (OUTPATIENT)
Dept: OTHER | Facility: HOSPITAL | Age: 33
End: 2023-09-05
Payer: COMMERCIAL

## 2023-09-05 DIAGNOSIS — O09.522 MULTIGRAVIDA OF ADVANCED MATERNAL AGE IN SECOND TRIMESTER: Primary | ICD-10-CM

## 2023-09-05 RX ORDER — NALOXONE HCL 0.4 MG/ML
0.4 VIAL (ML) INJECTION
OUTPATIENT
Start: 2023-09-05

## 2023-09-05 RX ORDER — MAGNESIUM CARB/ALUMINUM HYDROX 105-160MG
30 TABLET,CHEWABLE ORAL ONCE
OUTPATIENT
Start: 2023-09-05 | End: 2023-09-05

## 2023-09-05 RX ORDER — PROMETHAZINE HYDROCHLORIDE 12.5 MG/1
12.5 SUPPOSITORY RECTAL EVERY 6 HOURS PRN
OUTPATIENT
Start: 2023-09-05

## 2023-09-05 RX ORDER — TERBUTALINE SULFATE 1 MG/ML
0.25 INJECTION, SOLUTION SUBCUTANEOUS AS NEEDED
OUTPATIENT
Start: 2023-09-05

## 2023-09-05 RX ORDER — ACETAMINOPHEN 325 MG/1
650 TABLET ORAL EVERY 4 HOURS PRN
OUTPATIENT
Start: 2023-09-05

## 2023-09-05 RX ORDER — SODIUM CHLORIDE 9 MG/ML
40 INJECTION, SOLUTION INTRAVENOUS AS NEEDED
OUTPATIENT
Start: 2023-09-05

## 2023-09-05 RX ORDER — ONDANSETRON 2 MG/ML
4 INJECTION INTRAMUSCULAR; INTRAVENOUS EVERY 6 HOURS PRN
OUTPATIENT
Start: 2023-09-05

## 2023-09-05 RX ORDER — MORPHINE SULFATE 1 MG/ML
1 INJECTION, SOLUTION EPIDURAL; INTRATHECAL; INTRAVENOUS EVERY 4 HOURS PRN
OUTPATIENT
Start: 2023-09-05 | End: 2023-09-12

## 2023-09-05 RX ORDER — OXYTOCIN/0.9 % SODIUM CHLORIDE 30/500 ML
999 PLASTIC BAG, INJECTION (ML) INTRAVENOUS ONCE
OUTPATIENT
Start: 2023-09-05 | End: 2023-09-05

## 2023-09-05 RX ORDER — ONDANSETRON 4 MG/1
4 TABLET, FILM COATED ORAL EVERY 6 HOURS PRN
OUTPATIENT
Start: 2023-09-05

## 2023-09-05 RX ORDER — IBUPROFEN 600 MG/1
600 TABLET ORAL EVERY 6 HOURS PRN
OUTPATIENT
Start: 2023-09-05

## 2023-09-05 RX ORDER — PROMETHAZINE HYDROCHLORIDE 12.5 MG/1
12.5 TABLET ORAL EVERY 6 HOURS PRN
OUTPATIENT
Start: 2023-09-05

## 2023-09-05 RX ORDER — LIDOCAINE HYDROCHLORIDE 10 MG/ML
0.5 INJECTION, SOLUTION EPIDURAL; INFILTRATION; INTRACAUDAL; PERINEURAL ONCE AS NEEDED
OUTPATIENT
Start: 2023-09-05

## 2023-09-05 RX ORDER — METHYLERGONOVINE MALEATE 0.2 MG/ML
200 INJECTION INTRAVENOUS ONCE AS NEEDED
OUTPATIENT
Start: 2023-09-05

## 2023-09-05 RX ORDER — OXYTOCIN/0.9 % SODIUM CHLORIDE 30/500 ML
250 PLASTIC BAG, INJECTION (ML) INTRAVENOUS CONTINUOUS
OUTPATIENT
Start: 2023-09-05 | End: 2023-09-05

## 2023-09-05 RX ORDER — CARBOPROST TROMETHAMINE 250 UG/ML
250 INJECTION, SOLUTION INTRAMUSCULAR AS NEEDED
OUTPATIENT
Start: 2023-09-05

## 2023-09-05 RX ORDER — OXYTOCIN/0.9 % SODIUM CHLORIDE 30/500 ML
2-20 PLASTIC BAG, INJECTION (ML) INTRAVENOUS
OUTPATIENT
Start: 2023-09-05

## 2023-09-05 RX ORDER — CITRIC ACID/SODIUM CITRATE 334-500MG
30 SOLUTION, ORAL ORAL ONCE AS NEEDED
OUTPATIENT
Start: 2023-09-05

## 2023-09-05 RX ORDER — SODIUM CHLORIDE 0.9 % (FLUSH) 0.9 %
10 SYRINGE (ML) INJECTION AS NEEDED
OUTPATIENT
Start: 2023-09-05

## 2023-09-05 RX ORDER — SODIUM CHLORIDE 0.9 % (FLUSH) 0.9 %
10 SYRINGE (ML) INJECTION EVERY 12 HOURS SCHEDULED
OUTPATIENT
Start: 2023-09-05

## 2023-09-05 RX ORDER — SODIUM CHLORIDE, SODIUM LACTATE, POTASSIUM CHLORIDE, CALCIUM CHLORIDE 600; 310; 30; 20 MG/100ML; MG/100ML; MG/100ML; MG/100ML
125 INJECTION, SOLUTION INTRAVENOUS CONTINUOUS
OUTPATIENT
Start: 2023-09-05

## 2023-09-05 RX ORDER — MISOPROSTOL 200 UG/1
800 TABLET ORAL AS NEEDED
OUTPATIENT
Start: 2023-09-05

## 2023-09-05 RX ORDER — HYDROCODONE BITARTRATE AND ACETAMINOPHEN 5; 325 MG/1; MG/1
1 TABLET ORAL EVERY 4 HOURS PRN
OUTPATIENT
Start: 2023-09-05 | End: 2023-09-15

## 2023-09-06 ENCOUNTER — ROUTINE PRENATAL (OUTPATIENT)
Dept: OBSTETRICS AND GYNECOLOGY | Facility: CLINIC | Age: 33
End: 2023-09-06
Payer: COMMERCIAL

## 2023-09-06 VITALS — SYSTOLIC BLOOD PRESSURE: 122 MMHG | WEIGHT: 189.8 LBS | BODY MASS INDEX: 28.01 KG/M2 | DIASTOLIC BLOOD PRESSURE: 80 MMHG

## 2023-09-06 DIAGNOSIS — O09.522 MULTIGRAVIDA OF ADVANCED MATERNAL AGE IN SECOND TRIMESTER: Primary | ICD-10-CM

## 2023-09-06 NOTE — PROGRESS NOTES
OB FOLLOW UP  CC- Here for care of pregnancy        Smita Brown is a 33 y.o.  36w3d patient being seen today for her obstetrical follow up visit. Patient reports no complaints..     Her prenatal care is complicated by (and status) :   Patient Active Problem List   Diagnosis    Family history of congenital heart defect    Family history of genetic disease    Pregnancy    Irregular periods    Multigravida of advanced maternal age in second trimester       GBS Status: was already done and is negative.    No Known Allergies       Her Delivery Plan is: [facility]ires IOL at 39wks. Scheduled for  @ 6:00 AM  US today: no  Non Stress Test: No.    ROS -   Patient Reports : No Problems  Patient Denies: Loss of Fluid, Vaginal Spotting, Vision Changes, Headaches, Nausea , Vomiting , Contractions, and Epigastric pain  Fetal Movement : normal  All other systems reviewed and are negative.       The additional following portions of the patient's history were reviewed and updated as appropriate: allergies, current medications, past family history, past medical history, past social history, past surgical history, and problem list.    I have reviewed and agree with the HPI, ROS, and historical information as entered above. Ashu Molina MD        EXAM:     Prenatal Vitals  BP: 122/80  Weight: 86.1 kg (189 lb 12.8 oz)                              Assessment and Plan    Problem List Items Addressed This Visit          Gravid and     Multigravida of advanced maternal age in second trimester - Primary       Pregnancy at 36w3d  Fetal status reassuring.   Reviewed Pre-eclampsia signs/symptoms  Delivery options reviewed with patient  Signs of labor reviewed  Kick counts reviewed  Activity and Exercise discussed.  Return in 1 week (on 2023) for keep next weeks appointment.    Ashu Molina MD  2023

## 2023-09-06 NOTE — PATIENT INSTRUCTIONS
Prenatal Care  Prenatal care is health care during pregnancy. It helps you and your unborn baby (fetus) stay as healthy as possible. Prenatal care may be provided by a midwife, a family practice doctor, a mid-level practitioner (nurse practitioner or physician assistant), or a childbirth and pregnancy doctor (obstetrician).  How does this affect me?  During pregnancy, you will be closely monitored for any new conditions that might develop. To lower your risk of pregnancy complications, you and your health care provider will talk about any underlying conditions you have.  How does this affect my baby?  Early and consistent prenatal care increases the chance that your baby will be healthy during pregnancy. Prenatal care lowers the risk that your baby will be:  Born early (prematurely).  Smaller than expected at birth (small for gestational age).  What can I expect at the first prenatal care visit?  Your first prenatal care visit will likely be the longest. You should schedule your first prenatal care visit as soon as you know that you are pregnant. Your first visit is a good time to talk about any questions or concerns you have about pregnancy.  Medical history  At your visit, you and your health care provider will talk about your medical history, including:  Any past pregnancies.  Your family's medical history.  Medical history of the baby's father.  Any long-term (chronic) health conditions you have and how you manage them.  Any surgeries or procedures you have had.  Any current over-the-counter or prescription medicines, herbs, or supplements that you are taking.  Other factors that could pose a risk to your baby, including:  Exposure to harmful chemicals or radiation at work or at home.  Any substance use, including tobacco, alcohol, and drug use.  Your home setting and your stress levels, including:  Exposure to abuse or violence.  Household financial strain.  Your daily health habits, including diet and  exercise.  Tests and screenings  Your health care provider will:  Measure your weight, height, and blood pressure.  Do a physical exam, including a pelvic and breast exam.  Perform blood tests and urine tests to check for:  Urinary tract infection.  Sexually transmitted infections (STIs).  Low iron levels in your blood (anemia).  Blood type and certain proteins on red blood cells (Rh antibodies).  Infections and immunity to viruses, such as hepatitis B and rubella.  HIV (human immunodeficiency virus).  Discuss your options for genetic screening.  Tips about staying healthy  Your health care provider will also give you information about how to keep yourself and your baby healthy, including:  Nutrition and taking vitamins.  Physical activity.  How to manage pregnancy symptoms such as nausea and vomiting (morning sickness).  Infections and substances that may be harmful to your baby and how to avoid them.  Food safety.  Dental care.  Working.  Travel.  Warning signs to watch for and when to call your health care provider.  How often will I have prenatal care visits?  After your first prenatal care visit, you will have regular visits throughout your pregnancy. The visit schedule is often as follows:  Up to week 28 of pregnancy: once every 4 weeks.  28-36 weeks: once every 2 weeks.  After 36 weeks: every week until delivery.  Some women may have visits more or less often depending on any underlying health conditions and the health of the baby.  Keep all follow-up and prenatal care visits. This is important.  What happens during routine prenatal care visits?  Your health care provider will:  Measure your weight and blood pressure.  Check for fetal heart sounds.  Measure the height of your uterus in your abdomen (fundal height). This may be measured starting around week 20 of pregnancy.  Check the position of your baby inside your uterus.  Ask questions about your diet, sleeping patterns, and whether you can feel the baby  move.  Review warning signs to watch for and signs of labor.  Ask about any pregnancy symptoms you are having and how you are dealing with them. Symptoms may include:  Headaches.  Nausea and vomiting.  Vaginal discharge.  Swelling.  Fatigue.  Constipation.  Changes in your vision.  Feeling persistently sad or anxious.  Any discomfort, including back or pelvic pain.  Bleeding or spotting.  Make a list of questions to ask your health care provider at your routine visits.  What tests might I have during prenatal care visits?  You may have blood, urine, and imaging tests throughout your pregnancy, such as:  Urine tests to check for glucose, protein, or signs of infection.  Glucose tests to check for a form of diabetes that can develop during pregnancy (gestational diabetes mellitus). This is usually done around week 24 of pregnancy.  Ultrasounds to check your baby's growth and development, to check for birth defects, and to check your baby's well-being. These can also help to decide when you should deliver your baby.  A test to check for group B strep (GBS) infection. This is usually done around week 36 of pregnancy.  Genetic testing. This may include blood, fluid, or tissue sampling, or imaging tests, such as an ultrasound. Some genetic tests are done during the first trimester and some are done during the second trimester.  What else can I expect during prenatal care visits?  Your health care provider may recommend getting certain vaccines during pregnancy. These may include:  A yearly flu shot (annual influenza vaccine). This is especially important if you will be pregnant during flu season.  Tdap (tetanus, diphtheria, pertussis) vaccine. Getting this vaccine during pregnancy can protect your baby from whooping cough (pertussis) after birth. This vaccine may be recommended between weeks 27 and 36 of pregnancy.  A COVID-19 vaccine.  Later in your pregnancy, your health care provider may give you information  about:  Childbirth and breastfeeding classes.  Choosing a health care provider for your baby.  Umbilical cord banking.  Breastfeeding.  Birth control after your baby is born.  The hospital labor and delivery unit and how to set up a tour.  Registering at the hospital before you go into labor.  Where to find more information  Office on Women's Health: womenshealth.gov  American Pregnancy Association: americanpregnancy.org  March of Dimes: marchofdimes.org  Summary  Prenatal care helps you and your baby stay as healthy as possible during pregnancy.  Your first prenatal care visit will most likely be the longest.  You will have visits and tests throughout your pregnancy to monitor your health and your baby's health.  Bring a list of questions to your visits to ask your health care provider.  Make sure to keep all follow-up and prenatal care visits.  This information is not intended to replace advice given to you by your health care provider. Make sure you discuss any questions you have with your health care provider.  Document Revised: 09/30/2021 Document Reviewed: 09/30/2021  [employer] Patient Education © 2023 [employer]

## 2023-09-12 ENCOUNTER — ROUTINE PRENATAL (OUTPATIENT)
Dept: OBSTETRICS AND GYNECOLOGY | Facility: CLINIC | Age: 33
End: 2023-09-12
Payer: COMMERCIAL

## 2023-09-12 VITALS — BODY MASS INDEX: 28.66 KG/M2 | SYSTOLIC BLOOD PRESSURE: 120 MMHG | WEIGHT: 194.2 LBS | DIASTOLIC BLOOD PRESSURE: 74 MMHG

## 2023-09-12 DIAGNOSIS — Z34.93 PRENATAL CARE IN THIRD TRIMESTER: Primary | ICD-10-CM

## 2023-09-12 DIAGNOSIS — O09.523 MULTIGRAVIDA OF ADVANCED MATERNAL AGE IN THIRD TRIMESTER: ICD-10-CM

## 2023-09-12 LAB
GLUCOSE UR STRIP-MCNC: NEGATIVE MG/DL
PROT UR STRIP-MCNC: NEGATIVE MG/DL

## 2023-09-12 NOTE — PROGRESS NOTES
OB FOLLOW UP  CC- Here for care of pregnancy        Smita Brown is a 33 y.o.  37w2d patient being seen today for her obstetrical follow up visit. Patient reports intermittent cramping in her lower back, she denies dysuria.    Her prenatal care is complicated by (and status) :   Patient Active Problem List   Diagnosis    Family history of congenital heart defect    Family history of genetic disease    Pregnancy    Irregular periods    Multigravida of advanced maternal age in second trimester       GBS Status:   Group B Strep Culture   Date Value Ref Range Status   2023 No Group B Streptococcus isolated  Final         No Known Allergies       Her Delivery Plan is: [facility] IOL at 39wks. Scheduled   @ 0600  US today: no  Non Stress Test: No.    ROS -   Patient Denies: Loss of Fluid, Vaginal Spotting, Vision Changes, Headaches, Nausea , Vomiting , and Epigastric pain  Fetal Movement : normal  All other systems reviewed and are negative.       The additional following portions of the patient's history were reviewed and updated as appropriate: allergies, current medications, past family history, past medical history, past social history, past surgical history, and problem list.    I have reviewed and agree with the HPI, ROS, and historical information as entered above. Ashu Molina MD        EXAM:     Prenatal Vitals  BP: 120/74  Weight: 88.1 kg (194 lb 3.2 oz)                  Urine Glucose Read-only: Negative  Urine Protein Read-only: Negative           Assessment and Plan    Problem List Items Addressed This Visit          Gravid and     Multigravida of advanced maternal age in second trimester     Other Visit Diagnoses       Prenatal care in third trimester    -  Primary    Relevant Orders    POC Urinalysis Dipstick (Completed)            Pregnancy at 37w2d  Fetal status reassuring.   Reviewed Pre-eclampsia signs/symptoms  Discussed IOL options with patient. Pt. desires IOL  at 39 weeks.   Delivery options reviewed with patient  Signs of labor reviewed  Kick counts reviewed  Activity and Exercise discussed.  Return in about 1 week (around 9/19/2023) for Routine prenatal care.    Ashu Molina MD  09/12/2023

## 2023-09-18 ENCOUNTER — PREP FOR SURGERY (OUTPATIENT)
Dept: OTHER | Facility: HOSPITAL | Age: 33
End: 2023-09-18
Payer: COMMERCIAL

## 2023-09-19 ENCOUNTER — ROUTINE PRENATAL (OUTPATIENT)
Dept: OBSTETRICS AND GYNECOLOGY | Facility: CLINIC | Age: 33
End: 2023-09-19
Payer: COMMERCIAL

## 2023-09-19 VITALS — DIASTOLIC BLOOD PRESSURE: 66 MMHG | BODY MASS INDEX: 28.87 KG/M2 | WEIGHT: 195.6 LBS | SYSTOLIC BLOOD PRESSURE: 110 MMHG

## 2023-09-19 DIAGNOSIS — Z34.93 PRENATAL CARE IN THIRD TRIMESTER: Primary | ICD-10-CM

## 2023-09-19 LAB
GLUCOSE UR STRIP-MCNC: NEGATIVE MG/DL
PROT UR STRIP-MCNC: NEGATIVE MG/DL

## 2023-09-19 NOTE — PROGRESS NOTES
OB FOLLOW UP  CC- Here for care of pregnancy        Smita Brown is a 33 y.o.  38w2d patient being seen today for her obstetrical follow up visit. Patient reports intermittent cramping in her lower back. She states she lost her mucus plug last week after her membrane sweep.     Her prenatal care is complicated by (and status) :   Patient Active Problem List   Diagnosis    Family history of congenital heart defect    Family history of genetic disease    Pregnancy    Irregular periods    Multigravida of advanced maternal age in second trimester       GBS Status:   Group B Strep Culture   Date Value Ref Range Status   2023 No Group B Streptococcus isolated  Final         No Known Allergies     Her Delivery Plan is: [facility] IOL at 39wks. Scheduled  23 @ 0600  US today: no  Non Stress Test: No.    ROS -  Patient Denies: Loss of Fluid, Vaginal Spotting, Vision Changes, Headaches, Nausea , Vomiting , and Epigastric pain  Fetal Movement : normal  All other systems reviewed and are negative.       The additional following portions of the patient's history were reviewed and updated as appropriate: allergies, current medications, past family history, past medical history, past social history, past surgical history, and problem list.    I have reviewed and agree with the HPI, ROS, and historical information as entered above. Ashu Molina MD        EXAM:     Prenatal Vitals  BP: 110/66  Weight: 88.7 kg (195 lb 9.6 oz)                  Urine Glucose Read-only: Negative  Urine Protein Read-only: Negative           Assessment and Plan    Problem List Items Addressed This Visit    None  Visit Diagnoses       Prenatal care in third trimester    -  Primary    Relevant Orders    POC Urinalysis Dipstick (Completed)            Pregnancy at 38w2d  Fetal status reassuring.   Reviewed Pre-eclampsia signs/symptoms  Reviewed upcoming IOL with patient. Instructions given.  Delivery options reviewed with  patient  Signs of labor reviewed  Kick counts reviewed  Activity and Exercise discussed.  Return for Has induction scheduled for Monday.  No follow up needed at this point.    Ashu Molina MD  09/19/2023

## 2023-09-21 ENCOUNTER — HOSPITAL ENCOUNTER (OUTPATIENT)
Facility: HOSPITAL | Age: 33
Discharge: HOME | End: 2023-09-21
Attending: OBSTETRICS & GYNECOLOGY | Admitting: OBSTETRICS & GYNECOLOGY
Payer: COMMERCIAL

## 2023-09-21 VITALS
HEIGHT: 69 IN | RESPIRATION RATE: 16 BRPM | BODY MASS INDEX: 28.89 KG/M2 | TEMPERATURE: 98.6 F | SYSTOLIC BLOOD PRESSURE: 118 MMHG | DIASTOLIC BLOOD PRESSURE: 68 MMHG | HEART RATE: 74 BPM

## 2023-09-21 PROCEDURE — 99213 OFFICE O/P EST LOW 20 MIN: CPT | Performed by: OBSTETRICS & GYNECOLOGY

## 2023-09-21 PROCEDURE — 84112 EVAL AMNIOTIC FLUID PROTEIN: CPT | Performed by: OBSTETRICS & GYNECOLOGY

## 2023-09-21 PROCEDURE — 59025 FETAL NON-STRESS TEST: CPT | Performed by: OBSTETRICS & GYNECOLOGY

## 2023-09-21 PROCEDURE — 59025 FETAL NON-STRESS TEST: CPT

## 2023-09-21 PROCEDURE — G0463 HOSPITAL OUTPT CLINIC VISIT: HCPCS

## 2023-09-22 ENCOUNTER — ANESTHESIA EVENT (OUTPATIENT)
Dept: LABOR AND DELIVERY | Facility: HOSPITAL | Age: 33
End: 2023-09-22
Payer: COMMERCIAL

## 2023-09-22 ENCOUNTER — HOSPITAL ENCOUNTER (INPATIENT)
Facility: HOSPITAL | Age: 33
LOS: 3 days | Discharge: HOME | End: 2023-09-25
Attending: OBSTETRICS & GYNECOLOGY | Admitting: OBSTETRICS & GYNECOLOGY
Payer: COMMERCIAL

## 2023-09-22 ENCOUNTER — ANESTHESIA (OUTPATIENT)
Dept: LABOR AND DELIVERY | Facility: HOSPITAL | Age: 33
End: 2023-09-22
Payer: COMMERCIAL

## 2023-09-22 DIAGNOSIS — O09.522 MULTIGRAVIDA OF ADVANCED MATERNAL AGE IN SECOND TRIMESTER: ICD-10-CM

## 2023-09-22 PROBLEM — Z37.9 NORMAL LABOR: Status: ACTIVE | Noted: 2023-09-22

## 2023-09-22 LAB
ABO GROUP BLD: NORMAL
ALP SERPL-CCNC: 126 U/L (ref 39–117)
ALT SERPL W P-5'-P-CCNC: 57 U/L (ref 1–33)
AST SERPL-CCNC: 42 U/L (ref 1–32)
BILIRUB SERPL-MCNC: 0.2 MG/DL (ref 0–1.2)
BLD GP AB SCN SERPL QL: NEGATIVE
CREAT SERPL-MCNC: 0.45 MG/DL (ref 0.57–1)
DEPRECATED RDW RBC AUTO: 43.2 FL (ref 37–54)
ERYTHROCYTE [DISTWIDTH] IN BLOOD BY AUTOMATED COUNT: 12.4 % (ref 12.3–15.4)
HCT VFR BLD AUTO: 33 % (ref 34–46.6)
HGB BLD-MCNC: 11.4 G/DL (ref 12–15.9)
LDH SERPL-CCNC: 179 U/L (ref 135–214)
MCH RBC QN AUTO: 32.7 PG (ref 26.6–33)
MCHC RBC AUTO-ENTMCNC: 34.5 G/DL (ref 31.5–35.7)
MCV RBC AUTO: 94.6 FL (ref 79–97)
PLATELET # BLD AUTO: 211 10*3/MM3 (ref 140–450)
PMV BLD AUTO: 9.7 FL (ref 6–12)
POC AMNISURE: NEGATIVE
RBC # BLD AUTO: 3.49 10*6/MM3 (ref 3.77–5.28)
RH BLD: POSITIVE
T&S EXPIRATION DATE: NORMAL
URATE SERPL-MCNC: 4 MG/DL (ref 2.4–5.7)
WBC NRBC COR # BLD: 9.9 10*3/MM3 (ref 3.4–10.8)

## 2023-09-22 PROCEDURE — 51703 INSERT BLADDER CATH COMPLEX: CPT

## 2023-09-22 PROCEDURE — 59025 FETAL NON-STRESS TEST: CPT

## 2023-09-22 PROCEDURE — 82565 ASSAY OF CREATININE: CPT | Performed by: OBSTETRICS & GYNECOLOGY

## 2023-09-22 PROCEDURE — 85027 COMPLETE CBC AUTOMATED: CPT | Performed by: OBSTETRICS & GYNECOLOGY

## 2023-09-22 PROCEDURE — 25010000002 FENTANYL CITRATE (PF) 50 MCG/ML SOLUTION: Performed by: ANESTHESIOLOGY

## 2023-09-22 PROCEDURE — 84112 EVAL AMNIOTIC FLUID PROTEIN: CPT | Performed by: OBSTETRICS & GYNECOLOGY

## 2023-09-22 PROCEDURE — C1755 CATHETER, INTRASPINAL: HCPCS

## 2023-09-22 PROCEDURE — 86850 RBC ANTIBODY SCREEN: CPT | Performed by: OBSTETRICS & GYNECOLOGY

## 2023-09-22 PROCEDURE — 83615 LACTATE (LD) (LDH) ENZYME: CPT | Performed by: OBSTETRICS & GYNECOLOGY

## 2023-09-22 PROCEDURE — 84550 ASSAY OF BLOOD/URIC ACID: CPT | Performed by: OBSTETRICS & GYNECOLOGY

## 2023-09-22 PROCEDURE — 86900 BLOOD TYPING SEROLOGIC ABO: CPT | Performed by: OBSTETRICS & GYNECOLOGY

## 2023-09-22 PROCEDURE — 84075 ASSAY ALKALINE PHOSPHATASE: CPT | Performed by: OBSTETRICS & GYNECOLOGY

## 2023-09-22 PROCEDURE — 84460 ALANINE AMINO (ALT) (SGPT): CPT | Performed by: OBSTETRICS & GYNECOLOGY

## 2023-09-22 PROCEDURE — C1755 CATHETER, INTRASPINAL: HCPCS | Performed by: ANESTHESIOLOGY

## 2023-09-22 PROCEDURE — 84450 TRANSFERASE (AST) (SGOT): CPT | Performed by: OBSTETRICS & GYNECOLOGY

## 2023-09-22 PROCEDURE — S0260 H&P FOR SURGERY: HCPCS | Performed by: OBSTETRICS & GYNECOLOGY

## 2023-09-22 PROCEDURE — 82247 BILIRUBIN TOTAL: CPT | Performed by: OBSTETRICS & GYNECOLOGY

## 2023-09-22 PROCEDURE — 86901 BLOOD TYPING SEROLOGIC RH(D): CPT | Performed by: OBSTETRICS & GYNECOLOGY

## 2023-09-22 PROCEDURE — 25010000002 BUPIVACAINE (PF) 0.5 % SOLUTION: Performed by: ANESTHESIOLOGY

## 2023-09-22 RX ORDER — BUPIVACAINE HYDROCHLORIDE 5 MG/ML
INJECTION, SOLUTION EPIDURAL; INTRACAUDAL AS NEEDED
Status: DISCONTINUED | OUTPATIENT
Start: 2023-09-22 | End: 2023-09-23 | Stop reason: SURG

## 2023-09-22 RX ORDER — LIDOCAINE HYDROCHLORIDE AND EPINEPHRINE 15; 5 MG/ML; UG/ML
INJECTION, SOLUTION EPIDURAL AS NEEDED
Status: DISCONTINUED | OUTPATIENT
Start: 2023-09-22 | End: 2023-09-23 | Stop reason: SURG

## 2023-09-22 RX ORDER — SODIUM CHLORIDE 0.9 % (FLUSH) 0.9 %
10 SYRINGE (ML) INJECTION AS NEEDED
Status: DISCONTINUED | OUTPATIENT
Start: 2023-09-22 | End: 2023-09-23 | Stop reason: HOSPADM

## 2023-09-22 RX ORDER — EPHEDRINE SULFATE 5 MG/ML
10 INJECTION INTRAVENOUS
Status: DISCONTINUED | OUTPATIENT
Start: 2023-09-22 | End: 2023-09-23 | Stop reason: HOSPADM

## 2023-09-22 RX ORDER — METOCLOPRAMIDE HYDROCHLORIDE 5 MG/ML
10 INJECTION INTRAMUSCULAR; INTRAVENOUS ONCE AS NEEDED
Status: DISCONTINUED | OUTPATIENT
Start: 2023-09-22 | End: 2023-09-23 | Stop reason: HOSPADM

## 2023-09-22 RX ORDER — SODIUM CHLORIDE 9 MG/ML
40 INJECTION, SOLUTION INTRAVENOUS AS NEEDED
Status: DISCONTINUED | OUTPATIENT
Start: 2023-09-22 | End: 2023-09-23 | Stop reason: HOSPADM

## 2023-09-22 RX ORDER — ONDANSETRON 4 MG/1
4 TABLET, FILM COATED ORAL EVERY 6 HOURS PRN
Status: DISCONTINUED | OUTPATIENT
Start: 2023-09-22 | End: 2023-09-23 | Stop reason: SDUPTHER

## 2023-09-22 RX ORDER — LIDOCAINE HYDROCHLORIDE 10 MG/ML
0.5 INJECTION, SOLUTION EPIDURAL; INFILTRATION; INTRACAUDAL; PERINEURAL ONCE AS NEEDED
Status: DISCONTINUED | OUTPATIENT
Start: 2023-09-22 | End: 2023-09-23 | Stop reason: HOSPADM

## 2023-09-22 RX ORDER — SODIUM CHLORIDE 0.9 % (FLUSH) 0.9 %
10 SYRINGE (ML) INJECTION EVERY 12 HOURS SCHEDULED
Status: DISCONTINUED | OUTPATIENT
Start: 2023-09-22 | End: 2023-09-23 | Stop reason: HOSPADM

## 2023-09-22 RX ORDER — MAGNESIUM CARB/ALUMINUM HYDROX 105-160MG
30 TABLET,CHEWABLE ORAL ONCE
Status: DISCONTINUED | OUTPATIENT
Start: 2023-09-22 | End: 2023-09-23 | Stop reason: HOSPADM

## 2023-09-22 RX ORDER — ONDANSETRON 2 MG/ML
4 INJECTION INTRAMUSCULAR; INTRAVENOUS EVERY 6 HOURS PRN
Status: DISCONTINUED | OUTPATIENT
Start: 2023-09-22 | End: 2023-09-23 | Stop reason: SDUPTHER

## 2023-09-22 RX ORDER — ACETAMINOPHEN 325 MG/1
650 TABLET ORAL EVERY 4 HOURS PRN
Status: DISCONTINUED | OUTPATIENT
Start: 2023-09-22 | End: 2023-09-23 | Stop reason: HOSPADM

## 2023-09-22 RX ORDER — ROPIVACAINE HYDROCHLORIDE 2 MG/ML
15 INJECTION, SOLUTION EPIDURAL; INFILTRATION; PERINEURAL CONTINUOUS
Status: DISCONTINUED | OUTPATIENT
Start: 2023-09-22 | End: 2023-09-23

## 2023-09-22 RX ORDER — FENTANYL CITRATE 50 UG/ML
INJECTION, SOLUTION INTRAMUSCULAR; INTRAVENOUS AS NEEDED
Status: DISCONTINUED | OUTPATIENT
Start: 2023-09-22 | End: 2023-09-23 | Stop reason: SURG

## 2023-09-22 RX ORDER — CITRIC ACID/SODIUM CITRATE 334-500MG
30 SOLUTION, ORAL ORAL ONCE
Status: DISCONTINUED | OUTPATIENT
Start: 2023-09-22 | End: 2023-09-23 | Stop reason: HOSPADM

## 2023-09-22 RX ORDER — ONDANSETRON 2 MG/ML
4 INJECTION INTRAMUSCULAR; INTRAVENOUS ONCE AS NEEDED
Status: DISCONTINUED | OUTPATIENT
Start: 2023-09-22 | End: 2023-09-23 | Stop reason: HOSPADM

## 2023-09-22 RX ORDER — OXYTOCIN/0.9 % SODIUM CHLORIDE 30/500 ML
1-2 PLASTIC BAG, INJECTION (ML) INTRAVENOUS
Status: DISCONTINUED | OUTPATIENT
Start: 2023-09-22 | End: 2023-09-23

## 2023-09-22 RX ORDER — DIPHENHYDRAMINE HYDROCHLORIDE 50 MG/ML
12.5 INJECTION INTRAMUSCULAR; INTRAVENOUS EVERY 8 HOURS PRN
Status: DISCONTINUED | OUTPATIENT
Start: 2023-09-22 | End: 2023-09-23 | Stop reason: HOSPADM

## 2023-09-22 RX ORDER — FAMOTIDINE 10 MG/ML
20 INJECTION, SOLUTION INTRAVENOUS ONCE AS NEEDED
Status: DISCONTINUED | OUTPATIENT
Start: 2023-09-22 | End: 2023-09-23 | Stop reason: HOSPADM

## 2023-09-22 RX ORDER — SODIUM CHLORIDE, SODIUM LACTATE, POTASSIUM CHLORIDE, CALCIUM CHLORIDE 600; 310; 30; 20 MG/100ML; MG/100ML; MG/100ML; MG/100ML
125 INJECTION, SOLUTION INTRAVENOUS CONTINUOUS
Status: DISCONTINUED | OUTPATIENT
Start: 2023-09-22 | End: 2023-09-23

## 2023-09-22 RX ADMIN — SODIUM CHLORIDE, POTASSIUM CHLORIDE, SODIUM LACTATE AND CALCIUM CHLORIDE 125 ML/HR: 600; 310; 30; 20 INJECTION, SOLUTION INTRAVENOUS at 21:47

## 2023-09-22 RX ADMIN — EPHEDRINE SULFATE 10 MG: 5 INJECTION INTRAVENOUS at 23:09

## 2023-09-22 RX ADMIN — SODIUM CHLORIDE, POTASSIUM CHLORIDE, SODIUM LACTATE AND CALCIUM CHLORIDE 1000 ML: 600; 310; 30; 20 INJECTION, SOLUTION INTRAVENOUS at 21:33

## 2023-09-22 RX ADMIN — Medication 2 MILLI-UNITS/MIN: at 22:13

## 2023-09-22 RX ADMIN — SODIUM CHLORIDE, POTASSIUM CHLORIDE, SODIUM LACTATE AND CALCIUM CHLORIDE 1000 ML: 600; 310; 30; 20 INJECTION, SOLUTION INTRAVENOUS at 21:27

## 2023-09-22 RX ADMIN — SODIUM CHLORIDE, POTASSIUM CHLORIDE, SODIUM LACTATE AND CALCIUM CHLORIDE 125 ML/HR: 600; 310; 30; 20 INJECTION, SOLUTION INTRAVENOUS at 20:30

## 2023-09-22 RX ADMIN — FENTANYL CITRATE 100 MCG: 50 INJECTION, SOLUTION INTRAMUSCULAR; INTRAVENOUS at 21:39

## 2023-09-22 RX ADMIN — LIDOCAINE HYDROCHLORIDE AND EPINEPHRINE 3 ML: 15; 5 INJECTION, SOLUTION EPIDURAL at 21:39

## 2023-09-22 RX ADMIN — ROPIVACAINE HYDROCHLORIDE 15 ML/HR: 2 INJECTION, SOLUTION EPIDURAL; INFILTRATION; PERINEURAL at 21:39

## 2023-09-22 RX ADMIN — BUPIVACAINE HYDROCHLORIDE 6 ML: 5 INJECTION, SOLUTION EPIDURAL; INTRACAUDAL at 21:39

## 2023-09-22 NOTE — H&P
[facility]  Obstetric History and Physical    Referring Provider: Ashu Molina MD      Chief Complaint   Patient presents with    Rupture of Membranes       Subjective     Patient is a 33 y.o. female  currently at 38w5d, who presents with complaint of ruptured membranes.  Patient reports rupture of membranes (clear fluid) approximately 2 hours ago with irregular contractions.   course complicated by AMA, prior  delivery.  GBS negative        The following portions of the patients history were reviewed and updated as appropriate: current medications, allergies, past medical history, past surgical history, past family history, past social history, and problem list .       Prenatal Information:   Maternal Prenatal Labs  Blood Type No results found for: ABO   Rh Status No results found for: RH   Antibody Screen No results found for: ABSCRN   Gonnorhea No results found for: GCCX   Chlamydia No results found for: CLAMYDCU   RPR No results found for: RPR   Syphilis Antibody No results found for: SYPHILIS   Rubella No results found for: RUBELLAIGGIN   Hepatitis B Surface Antigen No results found for: HEPBSAG   HIV-1 Antibody No results found for: LABHIV1   Hepatitis C Antibody No results found for: HEPCAB   Rapid Urin Drug Screen No results found for: AMPMETHU, BARBITSCNUR, LABBENZSCN, LABMETHSCN, LABOPIASCN, THCURSCR, COCAINEUR, AMPHETSCREEN, PROPOXSCN, BUPRENORSCNU, METAMPSCNUR, OXYCODONESCN, TRICYCLICSCN   Group B Strep Culture No results found for: GBSANTIGEN           External Prenatal Results       Pregnancy Outside Results - Transcribed From Office Records - See Scanned Records For Details       Test Value Date Time    ABO  AB  23 0947    Rh  Positive  2347    Antibody Screen  Negative  23 1100       Negative  23 0947    Varicella IgG       Rubella  3.82 index 23 0947    Hgb  11.1 g/dL 23 1100       12.3 g/dL 23 0947    Hct  32.3 %  23 1100       36.5 % 23 0947    Glucose Fasting GTT       Glucose Tolerance Test 1 hour       Glucose Tolerance Test 3 hour       Gonorrhea (discrete)  Negative  23 0947    Chlamydia (discrete)  Negative  23 0947    RPR  Non Reactive  23 0947    VDRL       Syphilis Antibody       HBsAg  Negative  23 0947    Herpes Simplex Virus PCR       Herpes Simplex VIrus Culture       HIV  Non Reactive  23 0947    Hep C RNA Quant PCR       Hep C Antibody  Non Reactive  23 0947    AFP  27.4 ng/mL 23 0901    Group B Strep  No Group B Streptococcus isolated  23 1741    GBS Susceptibility to Clindamycin       GBS Susceptibility to Erythromycin       Fetal Fibronectin       Genetic Testing, Maternal Blood                 Drug Screening       Test Value Date Time    Urine Drug Screen       Amphetamine Screen  Negative ng/mL 23 0947    Barbiturate Screen  Negative ng/mL 23 0947    Benzodiazepine Screen  Negative ng/mL 23 0947    Methadone Screen  Negative ng/mL 23 0947    Phencyclidine Screen  Negative ng/mL 23 0947    Opiates Screen       THC Screen       Cocaine Screen       Propoxyphene Screen  Negative ng/mL 23 0947    Buprenorphine Screen       Methamphetamine Screen       Oxycodone Screen       Tricyclic Antidepressants Screen                 Legend    ^: Historical                              Past OB History:       OB History    Para Term  AB Living   3 2 1 1 0 2   SAB IAB Ectopic Molar Multiple Live Births   0 0 0 0 0 2      # Outcome Date GA Lbr Nando/2nd Weight Sex Delivery Anes PTL Lv   3 Current            2  20 36w3d  3045 g (6 lb 11.4 oz) M Vag-Spont EPI N CHARLENE      Name: BERNICEARGENTINAPATYSID      Apgar1: 8  Apgar5: 9   1 Term 19 40w0d  3232 g (7 lb 2 oz) F Vag-Spont EPI  CHARLENE      Birth Comments: Born with VSD - found on a 36 week growth scan      Obstetric Comments   Fob #1 - Pregnancy #1 -#3     NIPT - wnl    AFp - negative        Past Medical History: Past Medical History:   Diagnosis Date    Abnormal Pap smear of cervix     repeat ok    Migraine     Thumb fracture     concussions      Past Surgical History Past Surgical History:   Procedure Laterality Date    LEEP  2014    ORIF FINGER / THUMB FRACTURE      WISDOM TOOTH EXTRACTION        Family History: Family History   Problem Relation Age of Onset    No Known Problems Other     Breast cancer Paternal Grandmother       Social History:  reports that she has never smoked. She has never used smokeless tobacco.   reports that she does not currently use alcohol after a past usage of about 1.0 standard drink per week.   reports no history of drug use.                   General ROS Negative Findings:Headaches, Visual Changes, Epigastric pain, Anorexia, Nausia/Vomiting, and Vaginal Bleeding    ROS     All other systems have been reviewed and are neg  Objective       Vital Signs Range for the last 24 hours  Temperature: Temp:  [98.6 °F (37 °C)] 98.6 °F (37 °C)   Temp Source: Temp src: Oral   BP: BP: (118)/(68) 118/68   Pulse: Heart Rate:  [74] 74   Respirations: Resp:  [16] 16   SPO2:     O2 Amount (l/min):     O2 Devices     Weight:       Physical Examination:   General:   alert, appears stated age, and cooperative   Skin:   normal   HEENT: Sclera clear   Lungs:   clear to auscultation bilaterally   Heart:   regular rate and rhythm, S1, S2 normal, no murmur, click, rub or gallop   Gastrointestinal: Abdomen soft, gravid uterus nontender benign exam   Lower Extremities: Trace of edema, no calf tenderness   :    Musculoskeletal:     Neuro: No focal deficits noted         Presentation: vtx   Cervix: Exam by: Method: sterile exam per RN   Dilation:  5+   Effacement: Cervical Effacement: 90%   Station:         Fetal Heart Rate Assessment   Method:     Beats/min:     Baseline:     Varibility:     Accels:     Decels:     Tracing Category:       Uterine Assessment    Method:     Frequency (min):     Ctx Count in 10 min:     Duration:     Intensity:     Intensity by IUPC:     Resting Tone:     Resting Tone by IUPC:     Hickman Units:       Laboratory Results:   Lab Results (last 24 hours)       ** No results found for the last 24 hours. **          Radiology Review:   Imaging Results (Last 24 Hours)       ** No results found for the last 24 hours. **          Other Studies:    Assessment & Plan       Normal labor        Assessment:  1.  Intrauterine pregnancy at 38w5d weeks gestation with reactive fetal status.    2.  Labor with ruptured membranes  3.  GBS negative  4.  AMA.    Plan:  1.  Admit, labor epidural, labs, Pitocin augmentation if needed.  2. Plan of care has been reviewed with patient.  3.  Risks, benefits of treatment plan have been discussed.  4.  All questions have been answered.  5   discussed with Chapincito Mckeon DO  9/22/2023  19:27 EDT

## 2023-09-22 NOTE — H&P
ASHLEY Perez  Obstetric History and Physical    CC:  Leaking      HPI:      Patient is a 33 y.o. female  currently at 38w4d, who presents with a concern that she may be leaking.   She had increased wetness and was not sure if she was ruptured.   She denies current leaking and denies vaginal bleeding.   +FM.  No contractions.    She is scheduled for an IOL on this coming Monday.  She is 4-5, but has been and has no cervical change.          The following portions of the patients history were reviewed and updated as appropriate: current medications, allergies, past medical history, past surgical history, past family history, past social history and problem list .       Prenatal Information:   Maternal Prenatal Labs  Blood Type No results found for: ABO   Rh Status No results found for: RH   Antibody Screen No results found for: ABSCRN   Gonnorhea No results found for: GCCX   Chlamydia No results found for: CLAMYDCU   RPR No results found for: RPR   Syphilis Antibody No results found for: SYPHILIS   Rubella No results found for: RUBELLAIGGIN   Hepatitis B Surface Antigen No results found for: HEPBSAG   HIV-1 Antibody No results found for: LABHIV1   Hepatitis C Antibody No results found for: HEPCAB   Rapid Urin Drug Screen No results found for: AMPMETHU, BARBITSCNUR, LABBENZSCN, LABMETHSCN, LABOPIASCN, THCURSCR, COCAINEUR, AMPHETSCREEN, PROPOXSCN, BUPRENORSCNU, METAMPSCNUR, OXYCODONESCN, TRICYCLICSCN   Group B Strep Culture No results found for: GBSANTIGEN           External Prenatal Results       Pregnancy Outside Results - Transcribed From Office Records - See Scanned Records For Details       Test Value Date Time    ABO  AB  23 0947    Rh  Positive  2347    Antibody Screen  Negative  23 1100       Negative  23 0947    Varicella IgG       Rubella  3.82 index 23 0947    Hgb  11.1 g/dL 23 1100       12.3 g/dL 23 0947    Hct  32.3 % 23 1100       36.5 % 23  0947    Glucose Fasting GTT       Glucose Tolerance Test 1 hour       Glucose Tolerance Test 3 hour       Gonorrhea (discrete)  Negative  23 0947    Chlamydia (discrete)  Negative  23 0947    RPR  Non Reactive  23 0947    VDRL       Syphilis Antibody       HBsAg  Negative  23 0947    Herpes Simplex Virus PCR       Herpes Simplex VIrus Culture       HIV  Non Reactive  23 0947    Hep C RNA Quant PCR       Hep C Antibody  Non Reactive  23 0947    AFP  27.4 ng/mL 23 0901    Group B Strep  No Group B Streptococcus isolated  23 1741    GBS Susceptibility to Clindamycin       GBS Susceptibility to Erythromycin       Fetal Fibronectin       Genetic Testing, Maternal Blood                 Drug Screening       Test Value Date Time    Urine Drug Screen       Amphetamine Screen  Negative ng/mL 23 0947    Barbiturate Screen  Negative ng/mL 23 0947    Benzodiazepine Screen  Negative ng/mL 23 0947    Methadone Screen  Negative ng/mL 23 0947    Phencyclidine Screen  Negative ng/mL 23 0947    Opiates Screen       THC Screen       Cocaine Screen       Propoxyphene Screen  Negative ng/mL 23 0947    Buprenorphine Screen       Methamphetamine Screen       Oxycodone Screen       Tricyclic Antidepressants Screen                 Legend    ^: Historical                              Past OB History:     OB History    Para Term  AB Living   3 2 1 1 0 2   SAB IAB Ectopic Molar Multiple Live Births   0 0 0 0 0 2      # Outcome Date GA Lbr Nando/2nd Weight Sex Delivery Anes PTL Lv   3 Current            2  20 36w3d  3045 g (6 lb 11.4 oz) M Vag-Spont EPI N CHARLENE      Name: BERNICEARGENTINAPATYRAMESHKRISTENLEXX      Apgar1: 8  Apgar5: 9   1 Term 19 40w0d  3232 g (7 lb 2 oz) F Vag-Spont EPI  CHARLENE      Birth Comments: Born with VSD - found on a 36 week growth scan      Obstetric Comments   Fob #1 - Pregnancy #1 -#3    NIPT - wnl    AFp - negative         Past Medical History: Past Medical History:   Diagnosis Date    Abnormal Pap smear of cervix     repeat ok    Migraine     Thumb fracture     concussions      Past Surgical History Past Surgical History:   Procedure Laterality Date    LEEP  2014    ORIF FINGER / THUMB FRACTURE      WISDOM TOOTH EXTRACTION        Family History: Family History   Problem Relation Age of Onset    No Known Problems Other     Breast cancer Paternal Grandmother       Social History:  reports that she has never smoked. She has never used smokeless tobacco.   reports that she does not currently use alcohol after a past usage of about 1.0 standard drink per week.   reports no history of drug use.        Review of Systems  Denies fever, HA, CP, Shortness of air, muscle weakness,                                             and rashes      Objective     Vital Signs Range for the last 24 hours  Temperature: Temp:  [98.6 °F (37 °C)] 98.6 °F (37 °C)   Temp Source: Temp src: Oral   BP: BP: (118)/(68) 118/68   Pulse: Heart Rate:  [74] 74   Respirations: Resp:  [16] 16   SPO2:     O2 Amount (l/min):     O2 Devices     Weight:       Physical Examination: General appearance - alert, well appearing, and in no distress and oriented to person, place, and time  Chest - clear to auscultation bilaterally   Heart - RRR  Abdomen - soft, nontender, nondistended, no masses or organomegaly  no rebound tenderness noted  bowel sounds normal  No guarding, No RUQ pain  Extremities - pedal edema  - +1        Cervix: Exam by: Method: sterile exam per RN   Dilation:  4-5   Effacement: Cervical Effacement: 70%   Station:  -2     Fetal Heart Rate Assessment   Method: Fetal HR Assessment Method: external   Beats/min: Fetal HR (beats/min): 120   Baseline: Fetal HR Baseline: normal range   Varibility: Fetal HR Variability: moderate (amplitude range 6 to 25 bpm)   Accels: Fetal HR Accelerations: greater than/equal to 15 bpm, lasting at least 15 seconds   Decels: Fetal  HR Decelerations: absent   Tracing Category:       Uterine Assessment   Method: Method: palpation   Frequency (min):     Ctx Count in 10 min:     Duration:     Intensity: Contraction Intensity: no contractions   Intensity by IUPC:     Resting Tone: Uterine Resting Tone: soft by palpation   Resting Tone by IUPC:     [place] Units:      NST                     Indication:  Vaginal leaking   Start time 2300                 End time: 2325  15 x 15 accels x 2  Yes  No decels  Baseline  120s  Reactive NST - Yes     Laboratory Results:  AmniSure:  NEGATIVE         Assessment/Plan  1. Intrauterine pregnancy at 38w4d weeks gestation with reactive fetal status  2.  Vaginal discharge with no evidence of SROM or labour   3.  Given reassurance and education   4.  Questions answered  5.   D/C home.             Clinton Reyes MD  9/21/2023  23:31 EDT

## 2023-09-23 PROCEDURE — 0KQM0ZZ REPAIR PERINEUM MUSCLE, OPEN APPROACH: ICD-10-PCS | Performed by: OBSTETRICS & GYNECOLOGY

## 2023-09-23 PROCEDURE — 59400 OBSTETRICAL CARE: CPT | Performed by: OBSTETRICS & GYNECOLOGY

## 2023-09-23 RX ORDER — OXYTOCIN/0.9 % SODIUM CHLORIDE 30/500 ML
125 PLASTIC BAG, INJECTION (ML) INTRAVENOUS CONTINUOUS PRN
Status: DISCONTINUED | OUTPATIENT
Start: 2023-09-23 | End: 2023-09-25 | Stop reason: HOSPADM

## 2023-09-23 RX ORDER — METHYLERGONOVINE MALEATE 0.2 MG/ML
200 INJECTION INTRAVENOUS ONCE AS NEEDED
Status: DISCONTINUED | OUTPATIENT
Start: 2023-09-23 | End: 2023-09-23 | Stop reason: HOSPADM

## 2023-09-23 RX ORDER — PROMETHAZINE HYDROCHLORIDE 12.5 MG/1
12.5 TABLET ORAL EVERY 4 HOURS PRN
Status: DISCONTINUED | OUTPATIENT
Start: 2023-09-23 | End: 2023-09-25 | Stop reason: HOSPADM

## 2023-09-23 RX ORDER — ONDANSETRON 4 MG/1
4 TABLET, FILM COATED ORAL EVERY 6 HOURS PRN
Status: DISCONTINUED | OUTPATIENT
Start: 2023-09-23 | End: 2023-09-25 | Stop reason: HOSPADM

## 2023-09-23 RX ORDER — IBUPROFEN 600 MG/1
600 TABLET ORAL EVERY 6 HOURS PRN
Status: DISCONTINUED | OUTPATIENT
Start: 2023-09-23 | End: 2023-09-25 | Stop reason: HOSPADM

## 2023-09-23 RX ORDER — BISACODYL 10 MG
10 SUPPOSITORY, RECTAL RECTAL DAILY PRN
Status: DISCONTINUED | OUTPATIENT
Start: 2023-09-24 | End: 2023-09-25 | Stop reason: HOSPADM

## 2023-09-23 RX ORDER — MISOPROSTOL 200 UG/1
800 TABLET ORAL AS NEEDED
Status: DISCONTINUED | OUTPATIENT
Start: 2023-09-23 | End: 2023-09-25 | Stop reason: HOSPADM

## 2023-09-23 RX ORDER — DOCUSATE SODIUM 100 MG/1
100 CAPSULE, LIQUID FILLED ORAL 2 TIMES DAILY
Status: DISCONTINUED | OUTPATIENT
Start: 2023-09-23 | End: 2023-09-25 | Stop reason: HOSPADM

## 2023-09-23 RX ORDER — MISOPROSTOL 200 UG/1
800 TABLET ORAL ONCE AS NEEDED
Status: DISCONTINUED | OUTPATIENT
Start: 2023-09-23 | End: 2023-09-23 | Stop reason: HOSPADM

## 2023-09-23 RX ORDER — OXYTOCIN/0.9 % SODIUM CHLORIDE 30/500 ML
250 PLASTIC BAG, INJECTION (ML) INTRAVENOUS CONTINUOUS
Status: ACTIVE | OUTPATIENT
Start: 2023-09-23 | End: 2023-09-23

## 2023-09-23 RX ORDER — SODIUM CHLORIDE 0.9 % (FLUSH) 0.9 %
1-10 SYRINGE (ML) INJECTION AS NEEDED
Status: DISCONTINUED | OUTPATIENT
Start: 2023-09-23 | End: 2023-09-25 | Stop reason: HOSPADM

## 2023-09-23 RX ORDER — HYDROCODONE BITARTRATE AND ACETAMINOPHEN 10; 325 MG/1; MG/1
1 TABLET ORAL EVERY 4 HOURS PRN
Status: DISCONTINUED | OUTPATIENT
Start: 2023-09-23 | End: 2023-09-25 | Stop reason: HOSPADM

## 2023-09-23 RX ORDER — OXYTOCIN/0.9 % SODIUM CHLORIDE 30/500 ML
999 PLASTIC BAG, INJECTION (ML) INTRAVENOUS ONCE
Status: COMPLETED | OUTPATIENT
Start: 2023-09-23 | End: 2023-09-23

## 2023-09-23 RX ORDER — CARBOPROST TROMETHAMINE 250 UG/ML
250 INJECTION, SOLUTION INTRAMUSCULAR
Status: DISCONTINUED | OUTPATIENT
Start: 2023-09-23 | End: 2023-09-23 | Stop reason: HOSPADM

## 2023-09-23 RX ORDER — ACETAMINOPHEN 325 MG/1
650 TABLET ORAL EVERY 6 HOURS PRN
Status: DISCONTINUED | OUTPATIENT
Start: 2023-09-23 | End: 2023-09-25 | Stop reason: HOSPADM

## 2023-09-23 RX ORDER — ONDANSETRON 2 MG/ML
4 INJECTION INTRAMUSCULAR; INTRAVENOUS EVERY 6 HOURS PRN
Status: DISCONTINUED | OUTPATIENT
Start: 2023-09-23 | End: 2023-09-25 | Stop reason: HOSPADM

## 2023-09-23 RX ORDER — HYDROCODONE BITARTRATE AND ACETAMINOPHEN 5; 325 MG/1; MG/1
1 TABLET ORAL EVERY 4 HOURS PRN
Status: DISCONTINUED | OUTPATIENT
Start: 2023-09-23 | End: 2023-09-25 | Stop reason: HOSPADM

## 2023-09-23 RX ORDER — HYDROCORTISONE 25 MG/G
1 CREAM TOPICAL AS NEEDED
Status: DISCONTINUED | OUTPATIENT
Start: 2023-09-23 | End: 2023-09-25 | Stop reason: HOSPADM

## 2023-09-23 RX ADMIN — IBUPROFEN 600 MG: 600 TABLET, FILM COATED ORAL at 08:05

## 2023-09-23 RX ADMIN — Medication 999 ML/HR: at 01:32

## 2023-09-23 RX ADMIN — Medication 1 APPLICATION: at 04:46

## 2023-09-23 RX ADMIN — Medication: at 04:46

## 2023-09-23 RX ADMIN — WITCH HAZEL: 500 SOLUTION RECTAL; TOPICAL at 04:46

## 2023-09-23 RX ADMIN — IBUPROFEN 600 MG: 600 TABLET, FILM COATED ORAL at 22:08

## 2023-09-23 RX ADMIN — DOCUSATE SODIUM 100 MG: 100 CAPSULE, LIQUID FILLED ORAL at 20:03

## 2023-09-23 RX ADMIN — IBUPROFEN 600 MG: 600 TABLET, FILM COATED ORAL at 14:06

## 2023-09-23 RX ADMIN — DOCUSATE SODIUM 100 MG: 100 CAPSULE, LIQUID FILLED ORAL at 08:05

## 2023-09-23 NOTE — ANESTHESIA PROCEDURE NOTES
Labor Epidural      Patient reassessed immediately prior to procedure    Patient location during procedure: OB  Start Time: 9/22/2023 9:20 PM  Stop Time: 9/22/2023 9:49 PM  Performed By  Anesthesiologist: Amando Salinas MD  Preanesthetic Checklist  Completed: patient identified, IV checked, site marked, risks and benefits discussed, surgical consent, monitors and equipment checked, pre-op evaluation and timeout performed  Prep:  Pt Position:sitting  Sterile Tech:cap, gloves, mask and sterile barrier  Prep:DuraPrep  Monitoring:blood pressure monitoring  Epidural Block Procedure:  Approach:midline  Guidance:landmark technique  Location:L3-L4  Needle Type:Tuohy  Needle Gauge:17 G  Loss of Resistance Medium: air  Loss of Resistance: 5cm  Cath Depth at skin:15 cm  Paresthesia: none  Aspiration:negative  Test Dose:negative  Number of Attempts: 1  Post Assessment:  Dressing:occlusive dressing applied and secured with tape  Pt Tolerance:patient tolerated the procedure well with no apparent complications  Complications:no

## 2023-09-23 NOTE — ANESTHESIA POSTPROCEDURE EVALUATION
Patient: Smita Brown    Procedure Summary       Date: 09/22/23 Room / Location:     Anesthesia Start: 2129 Anesthesia Stop: 09/23/23 0051    Procedure: LABOR ANALGESIA Diagnosis:     Scheduled Providers:  Provider: Amando Salinas MD    Anesthesia Type: epidural ASA Status: 2 - Emergent            Anesthesia Type: epidural    Vitals  Vitals Value Taken Time   /55 09/23/23 1546   Temp 97.9 °F (36.6 °C) 09/23/23 1546   Pulse 56 09/23/23 1546   Resp 16 09/23/23 1546   SpO2             Post Anesthesia Care and Evaluation    Patient location during evaluation: bedside  Patient participation: complete - patient participated  Level of consciousness: awake  Pain score: 0  Pain management: satisfactory to patient    Airway patency: patent  Anesthetic complications: No anesthetic complications  PONV Status: none  Cardiovascular status: acceptable and hemodynamically stable  Respiratory status: acceptable  Hydration status: acceptable  Post Neuraxial Block status: Motor and sensory function returned to baseline and No signs or symptoms of PDPH

## 2023-09-23 NOTE — LACTATION NOTE
09/23/23 0950   Maternal Information   Date of Referral 09/23/23   Person Making Referral lactation consultant  (Courtesy visit newly Postpartum)   Maternal Reason for Referral other (see comments)  (Patient reports breast-feeding previous 2 children for 1 year each)   Infant Reason for Referral other (see comments)  (Patient reports baby's latch feels like she is biting)   Maternal Assessment   Breast Size Issue none   Breast Shape Bilateral:;round   Breast Density Bilateral:;soft   Nipples Bilateral:;short   Left Nipple Symptoms intact;tender   Right Nipple Symptoms intact;tender   Maternal Infant Feeding   Maternal Emotional State independent;receptive;relaxed   Infant Positioning cross-cradle;clutch/football   Signs of Milk Transfer deep jaw excursions noted  (During suckles assessment, clamping and biting noted)   Pain with Feeding yes   Pain Description other (see comments)  (Biting)   Comfort Measures Before/During Feeding infant position adjusted;latch adjusted;maternal position adjusted;suction broken using finger;other (see comments)  (Extra small nipple shield given with proper placement education provided)   Milk Ejection Reflex other (see comments)  (Hand expression demonstrated, colostrum easily expressed)   Nipple Shape After Feeding, Left Breast lipstick shape   Latch Assistance minimal assistance   Support Person Involvement actively supporting mother   Milk Expression/Equipment   Breast Pump Type double electric, personal  (Eli stride, [place])   Breast Pumping   Breast Pumping Interventions post-feed pumping encouraged  (for short/missed feedings, if supplementation is required, or if breastfeeding becomes too painful, to encourage breastmilk production)     Completed breastfeeding education encouraging pt to achieve a deep, comfortable latch throughout breastfeeding, which should be at least every 3 hours while giving baby stimulation for high quality transfer of breastmilk. Alternatively,  pumping encouraged every three hours, or at baby's feeding times for optimal milk initiation/production. All questions answered at this time, PRN Lactation Consultant/Clinic contact encouraged.

## 2023-09-23 NOTE — ANESTHESIA PREPROCEDURE EVALUATION
Anesthesia Evaluation     Patient summary reviewed and Nursing notes reviewed                Airway   Mallampati: I  TM distance: >3 FB  Neck ROM: full  No difficulty expected  Dental - normal exam     Pulmonary - negative pulmonary ROS and normal exam   Cardiovascular - negative cardio ROS and normal exam        Neuro/Psych- negative ROS  GI/Hepatic/Renal/Endo - negative ROS     Musculoskeletal (-) negative ROS    Abdominal  - normal exam    Bowel sounds: normal.   Substance History - negative use     OB/GYN    (+) Pregnant        Other                      Anesthesia Plan    ASA 2 - emergent     epidural       Anesthetic plan, risks, benefits, and alternatives have been provided, discussed and informed consent has been obtained with: patient.    Use of blood products discussed with patient .    Plan discussed with attending.    CODE STATUS:    Level Of Support Discussed With: Patient  Code Status (Patient has no pulse and is not breathing): CPR (Attempt to Resuscitate)  Medical Interventions (Patient has pulse or is breathing): Full Support

## 2023-09-23 NOTE — L&D DELIVERY NOTE
[facility]   Vaginal Delivery Note    Patient Name: Smita Brown  : 1990  MRN: [id number]    Date of Delivery: 2023     Diagnosis     Pre & Post-Delivery:  Intrauterine pregnancy at 38w6d  Labor status:      Normal labor     (normal spontaneous vaginal delivery)             Problem List    Transfer to Postpartum     Review the Delivery Report for details.     Delivery     Delivery:      YOB: 2025    Time of Birth:  Gestational Age 12:51 AM   38w6d     Anesthesia:      Delivering clinician: Enid Rey    Forceps?   No   Vacuum? No    Shoulder dystocia present: No        Delivery narrative: Patient pushed for 2 contractions and delivered head in GAVIN position.  Shoulders and body easily followed.  Nose and mouth were bulb suction.  Baby placed on maternal abdomen.  After 1 minute, the cord was clamped x2.  Father cut cord.  Cord blood was obtained.  Placenta delivered spontaneously and intact.  Second-degree laceration repaired with 3-0 Vicryl.  All sponge, lap, and needle counts were correct x2.      Infant     Findings: female  infant     Infant observations: Weight: No birth weight on file.   Length:   in  Observations/Comments:        Apgars:   9@ 1 minute /      9@ 5 minutes   Infant Name: Marce     Placenta & Cord         Placenta delivered  Spontaneous  at   2023 12:55 AM     Cord: 3 vessels  present.   Nuchal Cord?  no   Cord blood obtained: Yes    Cord gases obtained:  No    Cord gas results: Venous:  No results found for: PHCVEN    Arterial:  No results found for: PHCART     Repair     Episiotomy: None     No    Lacerations: Yes  Laceration Information  Laceration Repaired?   Perineal: 2nd      Periurethral:       Labial:       Sulcus:       Vaginal:       Cervical:         Suture used for repair: 3-0 Vicryl  Laceration Length: 2cm   Estimated Blood Loss: Est. Blood Loss (mL): 150 mL (Filed from Delivery Summary) (23 0051)     Quantitative  Blood Loss:          Complications     none    Disposition     Mother to Mother Baby/Postpartum  in stable condition currently.  Baby to remains with mom  in stable condition currently.    Enid Rey MD  09/23/23  01:08 EDT

## 2023-09-24 LAB
BASOPHILS # BLD AUTO: 0.06 10*3/MM3 (ref 0–0.2)
BASOPHILS NFR BLD AUTO: 0.6 % (ref 0–1.5)
DEPRECATED RDW RBC AUTO: 45.1 FL (ref 37–54)
EOSINOPHIL # BLD AUTO: 0.17 10*3/MM3 (ref 0–0.4)
EOSINOPHIL NFR BLD AUTO: 1.7 % (ref 0.3–6.2)
ERYTHROCYTE [DISTWIDTH] IN BLOOD BY AUTOMATED COUNT: 12.8 % (ref 12.3–15.4)
HCT VFR BLD AUTO: 33.1 % (ref 34–46.6)
HGB BLD-MCNC: 11 G/DL (ref 12–15.9)
IMM GRANULOCYTES # BLD AUTO: 0.27 10*3/MM3 (ref 0–0.05)
IMM GRANULOCYTES NFR BLD AUTO: 2.7 % (ref 0–0.5)
LYMPHOCYTES # BLD AUTO: 1.78 10*3/MM3 (ref 0.7–3.1)
LYMPHOCYTES NFR BLD AUTO: 18.1 % (ref 19.6–45.3)
MCH RBC QN AUTO: 32.4 PG (ref 26.6–33)
MCHC RBC AUTO-ENTMCNC: 33.2 G/DL (ref 31.5–35.7)
MCV RBC AUTO: 97.4 FL (ref 79–97)
MONOCYTES # BLD AUTO: 0.54 10*3/MM3 (ref 0.1–0.9)
MONOCYTES NFR BLD AUTO: 5.5 % (ref 5–12)
NEUTROPHILS NFR BLD AUTO: 7.02 10*3/MM3 (ref 1.7–7)
NEUTROPHILS NFR BLD AUTO: 71.4 % (ref 42.7–76)
NRBC BLD AUTO-RTO: 0 /100 WBC (ref 0–0.2)
PLATELET # BLD AUTO: 189 10*3/MM3 (ref 140–450)
PMV BLD AUTO: 9.8 FL (ref 6–12)
RBC # BLD AUTO: 3.4 10*6/MM3 (ref 3.77–5.28)
WBC NRBC COR # BLD: 9.84 10*3/MM3 (ref 3.4–10.8)

## 2023-09-24 PROCEDURE — 85025 COMPLETE CBC W/AUTO DIFF WBC: CPT | Performed by: OBSTETRICS & GYNECOLOGY

## 2023-09-24 RX ADMIN — IBUPROFEN 600 MG: 600 TABLET, FILM COATED ORAL at 08:27

## 2023-09-24 RX ADMIN — DOCUSATE SODIUM 100 MG: 100 CAPSULE, LIQUID FILLED ORAL at 21:36

## 2023-09-24 RX ADMIN — DOCUSATE SODIUM 100 MG: 100 CAPSULE, LIQUID FILLED ORAL at 08:27

## 2023-09-24 NOTE — PROGRESS NOTES
9/24/2023  PPD #1    Subjective   Smita feels well.  Patient describes her lochia less than menses.  Pain is well controlled       Objective   Temp: Temp:  [97.9 °F (36.6 °C)-98.3 °F (36.8 °C)] 98.1 °F (36.7 °C) Temp src: Oral   BP: BP: ()/(55-68) 107/57        Pulse: Heart Rate:  [] 51  RR: Resp:  [16-18] 16    General:  No acute distress   Abdomen: Fundus firm and beneath umbilicus   Pelvis: deferred     Lab Results   Component Value Date    WBC 9.84 09/24/2023    HGB 11.0 (L) 09/24/2023    HCT 33.1 (L) 09/24/2023    MCV 97.4 (H) 09/24/2023     09/24/2023    HEPBSAG Negative 02/28/2023       Assessment  PPD# 1 after vaginal delivery, doing well    Plan  Routine postpartum care.      This note has been electronically signed.    Dahiana Bryant, APRN  September 24, 2023

## 2023-09-25 VITALS
HEART RATE: 55 BPM | TEMPERATURE: 98.4 F | RESPIRATION RATE: 18 BRPM | SYSTOLIC BLOOD PRESSURE: 124 MMHG | DIASTOLIC BLOOD PRESSURE: 74 MMHG

## 2023-09-25 LAB
ALP SERPL-CCNC: 113 U/L (ref 39–117)
ALT SERPL W P-5'-P-CCNC: 48 U/L (ref 1–33)
AST SERPL-CCNC: 27 U/L (ref 1–32)
BILIRUB SERPL-MCNC: 0.2 MG/DL (ref 0–1.2)
CREAT SERPL-MCNC: 0.57 MG/DL (ref 0.57–1)
LDH SERPL-CCNC: 229 U/L (ref 135–214)
URATE SERPL-MCNC: 5.1 MG/DL (ref 2.4–5.7)

## 2023-09-25 PROCEDURE — 84460 ALANINE AMINO (ALT) (SGPT): CPT | Performed by: ADVANCED PRACTICE MIDWIFE

## 2023-09-25 PROCEDURE — 84550 ASSAY OF BLOOD/URIC ACID: CPT | Performed by: ADVANCED PRACTICE MIDWIFE

## 2023-09-25 PROCEDURE — 84450 TRANSFERASE (AST) (SGOT): CPT | Performed by: ADVANCED PRACTICE MIDWIFE

## 2023-09-25 PROCEDURE — 84075 ASSAY ALKALINE PHOSPHATASE: CPT | Performed by: ADVANCED PRACTICE MIDWIFE

## 2023-09-25 PROCEDURE — 82247 BILIRUBIN TOTAL: CPT | Performed by: ADVANCED PRACTICE MIDWIFE

## 2023-09-25 PROCEDURE — 83615 LACTATE (LD) (LDH) ENZYME: CPT | Performed by: ADVANCED PRACTICE MIDWIFE

## 2023-09-25 PROCEDURE — 0503F POSTPARTUM CARE VISIT: CPT | Performed by: ADVANCED PRACTICE MIDWIFE

## 2023-09-25 PROCEDURE — 82565 ASSAY OF CREATININE: CPT | Performed by: ADVANCED PRACTICE MIDWIFE

## 2023-09-25 RX ORDER — PSEUDOEPHEDRINE HCL 30 MG
100 TABLET ORAL 2 TIMES DAILY
Qty: 60 CAPSULE | Refills: 0 | Status: SHIPPED | OUTPATIENT
Start: 2023-09-25

## 2023-09-25 RX ORDER — IBUPROFEN 600 MG/1
600 TABLET ORAL EVERY 6 HOURS PRN
Qty: 90 TABLET | Refills: 0 | Status: SHIPPED | OUTPATIENT
Start: 2023-09-25

## 2023-09-25 RX ADMIN — DOCUSATE SODIUM 100 MG: 100 CAPSULE, LIQUID FILLED ORAL at 07:48

## 2023-09-25 NOTE — DISCHARGE SUMMARY
Discharge Summary    Date of Admission: 2023  Date of Discharge:  2023      Patient: Smita Brown      MR#:[id number]    Delivery Provider:  Enid Rey    Discharge Surgeon/OB: Ashu Molina    Presenting Problem/History of Present Illness  Normal labor [O80, Z37.9]       Normal labor     (normal spontaneous vaginal delivery)         Discharge Diagnosis: Vaginal delivery at 38w6d    Procedures:  Vaginal, Spontaneous     2023    12:51 AM        Discharge Date: 2023;     Hospital Course  Patient is a 33 y.o. female  at 38w6d status post vaginal delivery without complication. Postpartum the patient did well. She remained afebrile, with vital signs stable. She was ready for discharge on postpartum day 2. Her LFT's were slightly elevated , but on day of discharge have decreased. She is asymptomatic.    Infant:   female  fetus 3215 g (7 lb 1.4 oz)  with Apgar scores of 8 , 9  at five minutes.    Condition on Discharge:  Stable    Vital Signs  Temp:  [97.7 °F (36.5 °C)-98.5 °F (36.9 °C)] 98.4 °F (36.9 °C)  Heart Rate:  [55-60] 55  Resp:  [16-18] 18  BP: (111-129)/(56-74) 124/74    Lab Results   Component Value Date    WBC 9.84 2023    HGB 11.0 (L) 2023    HCT 33.1 (L) 2023    MCV 97.4 (H) 2023     2023       Discharge Disposition  Home or Self Care    Discharge Medications     Discharge Medications        New Medications        Instructions Start Date   docusate sodium 100 MG capsule   100 mg, Oral, 2 Times Daily      ibuprofen 600 MG tablet  Commonly known as: ADVIL,MOTRIN   600 mg, Oral, Every 6 Hours PRN             Continue These Medications        Instructions Start Date   ferrous sulfate 325 (65 FE) MG tablet   325 mg, Oral, Daily With Breakfast      PRENATAL VITAMINS PO   Oral               Discharge Diet:     Activity at Discharge:   Activity Instructions       Activity as Tolerated      Pelvic Rest      Pelvic rest including no  tampons, tub baths, or intercourse until 6 weeks postpartum/cleared by Provider.            Follow-up Appointments  No future appointments.  Additional Instructions for the Follow-ups that You Need to Schedule       Call MD for problems / concerns.   As directed      Discharge Follow-up with Specialty: Ashu Molina; 6 Weeks   As directed      Specialty: Ashu Molina   Follow Up: 6 Weeks                JAY Lynch  09/25/23  10:04 EDT  Csd

## 2023-09-26 NOTE — PAYOR COMM NOTE
"Ramesh Atkinson (33 y.o. Female)     [facility] Auth#[id number]     Discharged 23 with Baby.    From:Priscila Ness LPN, Utilization Review  Phone #[phone]  Fax #[phone]        Date of Birth   1990    Social Security Number       Address   [address] [place] [address]    Home Phone   [phone]    MRN   [id number]       B[place]ai   None    Marital Status                               Admission Date   23    Admission Type   Elective    Admitting Provider   Ashu Molina MD    Attending Provider       Department, Room/Bed   [facility] MOTHER BABY 4A, N405/1       Discharge Date   2023    Discharge Disposition   Home or Self Care    Discharge Destination                                 Attending Provider: (none)   Allergies: No Known Allergies    Isolation: None   Infection: COVID Screen (preop/placement) (20)   Code Status: Prior    Ht: 175.3 cm (69\")   Wt: 88.7 kg (195 lb 9.6 oz)    Admission Cmt: None   Principal Problem: Normal labor [O80,Z37.9]                   Active Insurance as of 2023       Primary Coverage       Payor Plan Insurance Group Employer/Plan Group    [facility] [id number]       Payor Plan Address Payor Plan Phone Number Payor Plan Fax Number Effective Dates    [address]   2019 - None Entered    [facility] [id number]         Subscriber Name Subscriber Birth Date Member ID       RAMESH ATKINSON 1990 [id number]                     Emergency Contacts        (Rel.) Home Phone Work Phone Mobile Phone    NEGRITA ATKINSON (Spouse) [phone] -- [phone]                 Operative/Procedure Notes (last 7 days)        Enid Rey MD at 23 0106           [facility]   Vaginal Delivery Note    Patient Name: Ramesh Atkinson  : 1990  MRN: [id number]    Date of Delivery: 2023     Diagnosis     Pre & " Post-Delivery:  Intrauterine pregnancy at 38w6d  Labor status:      Normal labor     (normal spontaneous vaginal delivery)             Problem List    Transfer to Postpartum     Review the Delivery Report for details.     Delivery     Delivery:      YOB: 2025    Time of Birth:  Gestational Age 12:51 AM   38w6d     Anesthesia:      Delivering clinician: Enid Rey    Forceps?   No   Vacuum? No    Shoulder dystocia present: No        Delivery narrative: Patient pushed for 2 contractions and delivered head in GAVIN position.  Shoulders and body easily followed.  Nose and mouth were bulb suction.  Baby placed on maternal abdomen.  After 1 minute, the cord was clamped x2.  Father cut cord.  Cord blood was obtained.  Placenta delivered spontaneously and intact.  Second-degree laceration repaired with 3-0 Vicryl.  All sponge, lap, and needle counts were correct x2.      Infant     Findings: female  infant     Infant observations: Weight: No birth weight on file.   Length:   in  Observations/Comments:        Apgars:   9@ 1 minute /      9@ 5 minutes   Infant Name: Marce     Placenta & Cord         Placenta delivered  Spontaneous  at   2023 12:55 AM     Cord: 3 vessels  present.   Nuchal Cord?  no   Cord blood obtained: Yes    Cord gases obtained:  No    Cord gas results: Venous:  No results found for: PHCVEN    Arterial:  No results found for: PHCART     Repair     Episiotomy: None     No    Lacerations: Yes  Laceration Information  Laceration Repaired?   Perineal: 2nd      Periurethral:       Labial:       Sulcus:       Vaginal:       Cervical:         Suture used for repair: 3-0 Vicryl  Laceration Length: 2cm   Estimated Blood Loss: Est. Blood Loss (mL): 150 mL (Filed from Delivery Summary) (23 005)     Quantitative Blood Loss:          Complications     none    Disposition     Mother to Mother Baby/Postpartum  in stable condition currently.  Baby to remains with mom  in stable  condition currently.    Enid Rey MD  23  01:08 EDT          Electronically signed by Enid Rey MD at 23 0108          Discharge Summary        Katie Martinez APRN at 23 1003       Attestation signed by Ashu Molina MD at 23 1016    I have reviewed this documentation and agree.                  Discharge Summary    Date of Admission: 2023  Date of Discharge:  2023      Patient: Smita Brown      MR#:[id number]    Delivery Provider:  Enid Rey    Discharge Surgeon/OB: Ashu Molina    Presenting Problem/History of Present Illness  Normal labor [O80, Z37.9]       Normal labor     (normal spontaneous vaginal delivery)         Discharge Diagnosis: Vaginal delivery at 38w6d    Procedures:  Vaginal, Spontaneous     2023    12:51 AM        Discharge Date: 2023;     Hospital Course  Patient is a 33 y.o. female  at 38w6d status post vaginal delivery without complication. Postpartum the patient did well. She remained afebrile, with vital signs stable. She was ready for discharge on postpartum day 2. Her LFT's were slightly elevated , but on day of discharge have decreased. She is asymptomatic.    Infant:   female  fetus 3215 g (7 lb 1.4 oz)  with Apgar scores of 8 , 9  at five minutes.    Condition on Discharge:  Stable    Vital Signs  Temp:  [97.7 °F (36.5 °C)-98.5 °F (36.9 °C)] 98.4 °F (36.9 °C)  Heart Rate:  [55-60] 55  Resp:  [16-18] 18  BP: (111-129)/(56-74) 124/74    Lab Results   Component Value Date    WBC 9.84 2023    HGB 11.0 (L) 2023    HCT 33.1 (L) 2023    MCV 97.4 (H) 2023     2023       Discharge Disposition  Home or Self Care    Discharge Medications     Discharge Medications        New Medications        Instructions Start Date   docusate sodium 100 MG capsule   100 mg, Oral, 2 Times Daily      ibuprofen 600 MG tablet  Commonly known as: ADVIL,MOTRIN   600 mg, Oral,  Every 6 Hours PRN             Continue These Medications        Instructions Start Date   ferrous sulfate 325 (65 FE) MG tablet   325 mg, Oral, Daily With Breakfast      PRENATAL VITAMINS PO   Oral               Discharge Diet:     Activity at Discharge:   Activity Instructions       Activity as Tolerated      Pelvic Rest      Pelvic rest including no tampons, tub baths, or intercourse until 6 weeks postpartum/cleared by Provider.            Follow-up Appointments  No future appointments.  Additional Instructions for the Follow-ups that You Need to Schedule       Call MD for problems / concerns.   As directed      Discharge Follow-up with Specialty: Ashu Molina; 6 Weeks   As directed      Specialty: Ashu Molina   Follow Up: 6 Weeks                JAY Lynch  09/25/23  10:04 EDT  Csd            Electronically signed by Ashu Molina MD at 09/25/23 1016

## 2024-12-17 ENCOUNTER — OFFICE VISIT (OUTPATIENT)
Dept: NEUROLOGY | Facility: CLINIC | Age: 34
End: 2024-12-17
Payer: COMMERCIAL

## 2024-12-17 VITALS
SYSTOLIC BLOOD PRESSURE: 104 MMHG | DIASTOLIC BLOOD PRESSURE: 72 MMHG | HEART RATE: 55 BPM | WEIGHT: 145 LBS | HEIGHT: 69 IN | OXYGEN SATURATION: 99 % | BODY MASS INDEX: 21.48 KG/M2

## 2024-12-17 DIAGNOSIS — G43.709 CHRONIC MIGRAINE WITHOUT AURA WITHOUT STATUS MIGRAINOSUS, NOT INTRACTABLE: Primary | ICD-10-CM

## 2024-12-17 RX ORDER — RIZATRIPTAN BENZOATE 10 MG/1
TABLET ORAL
COMMUNITY

## 2024-12-17 NOTE — PROGRESS NOTES
Subjective   Patient ID: Smita Brown is a 34 y.o. female     Chief Complaint   Patient presents with    Headache      History of Present Illness    34 y.o. female referred by Dr Annie Novak for headache.     MRI Brain, 24, normal     Three months over the summer 2024 one HA a week.      OD > OS loss of vision, arms tingle, trouble word finding then HA.     Located in frontal.  Quality is throbbing.  Photo/phono.      Ubrevly aborts in 20 minutes.      Last used at [facility].     Reviewed medical records:    HA on started 2024.      Loss of vision, R UE paresthesia, difficulty talking prior to migraines.    Last for four hours.      Admission [facility] 23 to 23 .    Stopped breastfeeding.     Preventative:  Inderal,   Abortive:  Ubrevly, Maxalt    Past Medical History:   Diagnosis Date    Abnormal Pap smear of cervix     repeat ok    Cluster headache     Migraine     Thumb fracture     concussions     Family History   Problem Relation Age of Onset    No Known Problems Other     Breast cancer Paternal Grandmother     Migraines Mother     Dementia Paternal Grandfather      Social History     Socioeconomic History    Marital status:    Tobacco Use    Smoking status: Never     Passive exposure: Never    Smokeless tobacco: Never   Vaping Use    Vaping status: Never Used   Substance and Sexual Activity    Alcohol use: Not Currently     Alcohol/week: 1.0 standard drink of alcohol     Types: 1 Glasses of wine per week     Comment: socially    Drug use: Never    Sexual activity: Yes     Partners: Male     Birth control/protection: Vasectomy       Review of Systems   Constitutional:  Negative for activity change, fatigue and unexpected weight change.   HENT:  Negative for tinnitus and trouble swallowing.    Eyes:  Positive for visual disturbance. Negative for photophobia.   Respiratory:  Negative for apnea, cough and choking.    Cardiovascular:  Negative for leg swelling.  "  Gastrointestinal:  Negative for nausea and vomiting.   Endocrine: Negative for cold intolerance and heat intolerance.   Genitourinary:  Negative for difficulty urinating, frequency, menstrual problem and urgency.   Musculoskeletal:  Negative for back pain, gait problem, myalgias and neck pain.   Skin:  Negative for color change and rash.   Allergic/Immunologic: Negative for immunocompromised state.   Neurological:  Positive for numbness and headaches. Negative for dizziness, tremors, seizures, syncope, facial asymmetry, speech difficulty, weakness and light-headedness.   Hematological:  Negative for adenopathy. Does not bruise/bleed easily.   Psychiatric/Behavioral:  Negative for behavioral problems, confusion, decreased concentration, hallucinations and sleep disturbance.        Objective     Vitals:    12/17/24 1137   BP: 104/72   Pulse: 55   SpO2: 99%   Weight: 65.8 kg (145 lb)   Height: 175.3 cm (69\")       Neurological Exam  Mental Status  Awake, alert and oriented to person, place and time. Oriented to person, place and time. Speech is normal. Language is fluent with no aphasia. Attention and concentration are normal. Fund of knowledge is appropriate for level of education.    Cranial Nerves  CN III, IV, VI: Extraocular movements intact bilaterally. Pupils equal round and reactive to light bilaterally.  CN V: Facial sensation is normal.  CN VII: Full and symmetric facial movement.  CN IX, X: Palate elevates symmetrically  CN XI: Shoulder shrug strength is normal.  CN XII: Tongue midline without atrophy or fasciculations.    Motor   Strength is 5/5 throughout all four extremities.    Sensory  Sensation is intact to light touch, pinprick, vibration and proprioception in all four extremities.    Reflexes  Deep tendon reflexes are 2+ and symmetric in all four extremities.    Coordination    Finger-to-nose, rapid alternating movements and heel-to-shin normal bilaterally without dysmetria.    Gait  Normal casual, " toe, heel and tandem gait.       Physical Exam  Eyes:      Extraocular Movements: Extraocular movements intact.      Pupils: Pupils are equal, round, and reactive to light.   Neurological:      Motor: Motor strength is normal.     Coordination: Coordination is intact.      Deep Tendon Reflexes: Reflexes are normal and symmetric.   Psychiatric:         Speech: Speech normal.         Admission on 09/22/2023, Discharged on 09/25/2023   Component Date Value Ref Range Status    WBC 09/22/2023 9.90  3.40 - 10.80 10*3/mm3 Final    RBC 09/22/2023 3.49 (L)  3.77 - 5.28 10*6/mm3 Final    Hemoglobin 09/22/2023 11.4 (L)  12.0 - 15.9 g/dL Final    Hematocrit 09/22/2023 33.0 (L)  34.0 - 46.6 % Final    MCV 09/22/2023 94.6  79.0 - 97.0 fL Final    MCH 09/22/2023 32.7  26.6 - 33.0 pg Final    MCHC 09/22/2023 34.5  31.5 - 35.7 g/dL Final    RDW 09/22/2023 12.4  12.3 - 15.4 % Final    RDW-SD 09/22/2023 43.2  37.0 - 54.0 fl Final    MPV 09/22/2023 9.7  6.0 - 12.0 fL Final    Platelets 09/22/2023 211  140 - 450 10*3/mm3 Final    ABO Type 09/22/2023 AB   Final    RH type 09/22/2023 Positive   Final    Antibody Screen 09/22/2023 Negative   Final    T&S Expiration Date 09/22/2023 9/25/2023 11:59:59 PM   Final    Alkaline Phosphatase 09/22/2023 126 (H)  39 - 117 U/L Final    ALT (SGPT) 09/22/2023 57 (H)  1 - 33 U/L Final    AST (SGOT) 09/22/2023 42 (H)  1 - 32 U/L Final    Creatinine 09/22/2023 0.45 (L)  0.57 - 1.00 mg/dL Final    Total Bilirubin 09/22/2023 0.2  0.0 - 1.2 mg/dL Final    LDH 09/22/2023 179  135 - 214 U/L Final    Uric Acid 09/22/2023 4.0  2.4 - 5.7 mg/dL Final    WBC 09/24/2023 9.84  3.40 - 10.80 10*3/mm3 Final    RBC 09/24/2023 3.40 (L)  3.77 - 5.28 10*6/mm3 Final    Hemoglobin 09/24/2023 11.0 (L)  12.0 - 15.9 g/dL Final    Hematocrit 09/24/2023 33.1 (L)  34.0 - 46.6 % Final    MCV 09/24/2023 97.4 (H)  79.0 - 97.0 fL Final    MCH 09/24/2023 32.4  26.6 - 33.0 pg Final    MCHC 09/24/2023 33.2  31.5 - 35.7 g/dL Final    RDW  09/24/2023 12.8  12.3 - 15.4 % Final    RDW-SD 09/24/2023 45.1  37.0 - 54.0 fl Final    MPV 09/24/2023 9.8  6.0 - 12.0 fL Final    Platelets 09/24/2023 189  140 - 450 10*3/mm3 Final    Neutrophil % 09/24/2023 71.4  42.7 - 76.0 % Final    Lymphocyte % 09/24/2023 18.1 (L)  19.6 - 45.3 % Final    Monocyte % 09/24/2023 5.5  5.0 - 12.0 % Final    Eosinophil % 09/24/2023 1.7  0.3 - 6.2 % Final    Basophil % 09/24/2023 0.6  0.0 - 1.5 % Final    Immature Grans % 09/24/2023 2.7 (H)  0.0 - 0.5 % Final    Neutrophils, Absolute 09/24/2023 7.02 (H)  1.70 - 7.00 10*3/mm3 Final    Lymphocytes, Absolute 09/24/2023 1.78  0.70 - 3.10 10*3/mm3 Final    Monocytes, Absolute 09/24/2023 0.54  0.10 - 0.90 10*3/mm3 Final    Eosinophils, Absolute 09/24/2023 0.17  0.00 - 0.40 10*3/mm3 Final    Basophils, Absolute 09/24/2023 0.06  0.00 - 0.20 10*3/mm3 Final    Immature Grans, Absolute 09/24/2023 0.27 (H)  0.00 - 0.05 10*3/mm3 Final    nRBC 09/24/2023 0.0  0.0 - 0.2 /100 WBC Final    Alkaline Phosphatase 09/25/2023 113  39 - 117 U/L Final    ALT (SGPT) 09/25/2023 48 (H)  1 - 33 U/L Final    AST (SGOT) 09/25/2023 27  1 - 32 U/L Final    Creatinine 09/25/2023 0.57  0.57 - 1.00 mg/dL Final    Total Bilirubin 09/25/2023 0.2  0.0 - 1.2 mg/dL Final    LDH 09/25/2023 229 (H)  135 - 214 U/L Final    Uric Acid 09/25/2023 5.1  2.4 - 5.7 mg/dL Final         Assessment & Plan     Problem List Items Addressed This Visit          Neuro    Chronic migraine without aura without status migrainosus, not intractable - Primary    Current Assessment & Plan     Ubrevly prn                  Relevant Medications    rizatriptan (MAXALT) 10 MG tablet    ubrogepant (Ubrelvy) 100 MG tablet          Return in about 1 year (around 12/17/2025).

## 2024-12-18 ENCOUNTER — SPECIALTY PHARMACY (OUTPATIENT)
Dept: ONCOLOGY | Facility: HOSPITAL | Age: 34
End: 2024-12-18
Payer: COMMERCIAL

## 2025-01-07 ENCOUNTER — SPECIALTY PHARMACY (OUTPATIENT)
Dept: ONCOLOGY | Facility: HOSPITAL | Age: 35
End: 2025-01-07
Payer: COMMERCIAL

## 2025-01-07 NOTE — PROGRESS NOTES
Specialty Pharmacy Patient Management Program  [facility]      Smita is a 34 y.o. female contacted today regarding refills of her medication(s).    Specialty medication(s) and dose(s) confirmed: initial fill of Ubrelvy  Other medications being refilled: none        Delivery Questions      Flowsheet Row Most Recent Value   Delivery method UPS   Delivery address verified with patient/caregiver? Yes   Delivery address Home   Number of medications in delivery 1   Medication(s) being filled and delivered Ubrogepant (UBRELVY)   Doses left of specialty medications new start   Copay verified? Yes   Copay amount Ubrelvy co-pay $0   Copay form of payment No copayment ($0)   Ship Date 1/9   Delivery Date 1/10   Signature Required No                   Follow-up: 25 days     Maria Isabel Manzano, PharmD  Specialty Pharmacist  1/7/2025  10:39 EST

## 2025-02-03 ENCOUNTER — SPECIALTY PHARMACY (OUTPATIENT)
Dept: ONCOLOGY | Facility: HOSPITAL | Age: 35
End: 2025-02-03
Payer: COMMERCIAL

## 2025-02-03 NOTE — PROGRESS NOTES
Specialty Pharmacy Refill Coordination Note     Smita is a 34 y.o. female contacted today regarding refills of Ubrelvy specialty medication(s).    Reviewed and verified with patient:       Specialty medication(s) and dose(s) confirmed: yes    Refill Questions      Flowsheet Row Most Recent Value   Changes to allergies? No   Changes to medications? No   New conditions or infections since last clinic visit No   Unplanned office visit, urgent care, ED, or hospital admission in the last 4 weeks  No   How does patient/caregiver feel medication is working? Good   Financial problems or insurance changes  No   Since the previous refill, were any specialty medication doses or scheduled injections missed or delayed?  No   Does this patient require a clinical escalation to a pharmacist? No            Delivery Questions      Flowsheet Row Most Recent Value   Delivery method UPS   Delivery address verified with patient/caregiver? Yes   Delivery address Home   Number of medications in delivery 1   Medication(s) being filled and delivered Ubrogepant (UBRELVY)   Doses left of specialty medications Ubrelvy 3 tablets left as of 02/03   Copay verified? Yes   Copay amount Ubrelvy =$0   Copay form of payment No copayment ($0)   Ship Date 02/03   Delivery Date Selection 02/04/25   Signature Required No                   Follow-up: 30 day(s)     Christina Lynch  Specialty Pharmacy Technician

## 2025-03-13 ENCOUNTER — TELEPHONE (OUTPATIENT)
Dept: OBSTETRICS AND GYNECOLOGY | Facility: CLINIC | Age: 35
End: 2025-03-13

## 2025-03-13 NOTE — TELEPHONE ENCOUNTER
Caller: Smita Brown    Relationship:  Self    Best call back number: [phone]     PATIENT CALLED REQUESTING TO CANCEL SAME DAY APPT.    Did the patient call AFTER the start time of their scheduled appointment?  []YES  [x]NO    Was the patient's appointment rescheduled? [x]YES  []NO 04/03/25    Any additional information: PATIENT CALLED TO RESCHEDULE TODAY'S ANNUAL WITH FER HOGUE APRN AT 2:30 PM.

## 2025-04-16 ENCOUNTER — OFFICE VISIT (OUTPATIENT)
Dept: OBSTETRICS AND GYNECOLOGY | Facility: CLINIC | Age: 35
End: 2025-04-16
Payer: COMMERCIAL

## 2025-04-16 VITALS
BODY MASS INDEX: 21.98 KG/M2 | SYSTOLIC BLOOD PRESSURE: 122 MMHG | DIASTOLIC BLOOD PRESSURE: 74 MMHG | WEIGHT: 148.4 LBS | HEIGHT: 69 IN

## 2025-04-16 DIAGNOSIS — Z01.419 WELL WOMAN EXAM WITH ROUTINE GYNECOLOGICAL EXAM: Primary | ICD-10-CM

## 2025-04-16 PROBLEM — Z34.90 PREGNANCY: Status: RESOLVED | Noted: 2020-02-28 | Resolved: 2025-04-16

## 2025-04-16 PROBLEM — O09.522 MULTIGRAVIDA OF ADVANCED MATERNAL AGE IN SECOND TRIMESTER: Status: RESOLVED | Noted: 2023-06-06 | Resolved: 2025-04-16

## 2025-04-16 PROBLEM — Z37.9 NORMAL LABOR: Status: RESOLVED | Noted: 2023-09-22 | Resolved: 2025-04-16

## 2025-04-16 NOTE — PROGRESS NOTES
Gynecologic Annual Exam Note        Gynecologic Exam        Subjective     HPI  Smita Brown is a 34 y.o.  female who presents for annual well woman exam as an established patient. There were no changes to her medical or surgical history since her last visit. Patient's last menstrual period was 2025 (exact date). Her periods occur every 22-28 days, lasting 4 days.  The flow is light-moderate. She denies dysmenorrhea. Marital Status: .  She is sexually active. She has not had new partners. STD testing recommendations have been explained to the patient and she does not desire STD testing.    The patient would like to discuss the following complaints today: She has breast US scheduled on  weeks for right breast pain that is intermittent, has been occurring since 3/3/25. She had breast exam in March at PCP office that was unremarkable.     Additional OB/GYN History   contraceptive methods: Vasectomy   Desires to: do not start contraception  Thromboembolic Disease: none  History of migraines: yes with aura    History of STD: no    Last Pap : 20. Results: negative. HPV: negative.   Last Completed Pap Smear    This patient has no relevant Health Maintenance data.          History of abnormal Pap smear: yes - LEEP 2014  Gardasil status:declines  Family history of uterine, colon, breast, or ovarian cancer: yes - PGM- breast cancer  Performs monthly Self-Breast Exam: yes  Exercises Regularly:yes  Feelings of Anxiety or Depression: no  Tobacco Usage?: No       Current Outpatient Medications:     ubrogepant (Ubrelvy) 100 MG tablet, Take 1 tablet by mouth daily as needed for migraines - Take at onset of headache - May repeat once in 2 hours if needed (max of 200 mg/ 24 hrs), Disp: 16 tablet, Rfl: 11     Patient denies the need for medication refills today.    OB History          3    Para   3    Term   2       1    AB   0    Living   3         SAB   0    IAB   0     "Ectopic   0    Molar   0    Multiple   0    Live Births   3          Obstetric Comments   Fob #1 - Pregnancy #1 -#3   NIPT - wnl   AFp - negative                Health Maintenance   Topic Date Due    MOST FORM  Never done    ANNUAL PHYSICAL  12/02/2021    PAP SMEAR  12/02/2023    Annual Gynecologic Pelvic and Breast Exam  12/28/2023    COVID-19 Vaccine (5 - 2024-25 season) 09/01/2024    INFLUENZA VACCINE  07/01/2025    TDAP/TD VACCINES (3 - Td or Tdap) 07/05/2033    HEPATITIS C SCREENING  Completed    Pneumococcal Vaccine 0-49  Aged Out       Past Medical History:   Diagnosis Date    Abnormal Pap smear of cervix     repeat ok    Anemia     Cluster headache     Migraine     Thumb fracture     concussions        Past Surgical History:   Procedure Laterality Date    LEEP  2014    ORIF FINGER / THUMB FRACTURE      WISDOM TOOTH EXTRACTION         The additional following portions of the patient's history were reviewed and updated as appropriate: allergies, current medications, past family history, past medical history, past social history, past surgical history, and problem list.    Review of Systems   Constitutional: Negative.    HENT: Negative.     Eyes: Negative.    Respiratory: Negative.     Cardiovascular: Negative.    Gastrointestinal: Negative.    Endocrine: Negative.    Genitourinary:  Positive for breast pain (right).   Musculoskeletal: Negative.    Skin: Negative.    Allergic/Immunologic: Negative.    Neurological: Negative.    Hematological: Negative.    Psychiatric/Behavioral: Negative.           I have reviewed and agree with the HPI, ROS, and historical information as entered above. JAY Lynch          Objective   /74   Ht 175.3 cm (69\")   Wt 67.3 kg (148 lb 6.4 oz)   LMP 04/12/2025 (Exact Date)   Breastfeeding No   BMI 21.91 kg/m²     Physical Exam  Vitals and nursing note reviewed. Exam conducted with a chaperone present.   Constitutional:       General: She is not in acute distress.     " Appearance: Normal appearance. She is well-developed and normal weight. She is not ill-appearing.   Neck:      Thyroid: No thyroid mass or thyromegaly.   Pulmonary:      Effort: Pulmonary effort is normal. No respiratory distress or retractions.   Chest:      Chest wall: No mass.   Breasts:     Right: Normal. No mass, nipple discharge, skin change or tenderness.      Left: Normal. No mass, nipple discharge, skin change or tenderness.      Comments: Fibrocystic tissue present bilaterally    Abdominal:      General: There is no distension.      Palpations: Abdomen is soft. Abdomen is not rigid. There is no mass.      Tenderness: There is no abdominal tenderness. There is no guarding or rebound.      Hernia: No hernia is present.   Genitourinary:     General: Normal vulva.      Exam position: Lithotomy position.      Labia:         Right: No rash, tenderness or lesion.         Left: No rash, tenderness or lesion.       Vagina: Normal. Bleeding present. No vaginal discharge or lesions.      Cervix: Normal. Cervical bleeding present. No cervical motion tenderness, discharge or friability.      Uterus: Normal. Not enlarged, not fixed and not tender.       Adnexa: Right adnexa normal and left adnexa normal.        Right: No mass or tenderness.          Left: No mass or tenderness.        Rectum: Normal. No external hemorrhoid.      Comments: Coming off menses  Musculoskeletal:      Cervical back: No muscular tenderness.   Skin:     General: Skin is warm and dry.   Neurological:      Mental Status: She is alert and oriented to person, place, and time.   Psychiatric:         Mood and Affect: Mood normal.         Behavior: Behavior normal.            Assessment and Plan    Problem List Items Addressed This Visit    None  Visit Diagnoses         Well woman exam with routine gynecological exam    -  Primary    Relevant Orders    LIQUID-BASED PAP SMEAR WITH HPV GENOTYPING REGARDLESS OF INTERPRETATION ([facility])            GYN  annual well woman exam.   Reviewed pap guidelines. Obtained today.   Fibrocystic breast changes - Encouraged decreasing caffeine, supportive bra, low dose vitamin E supplementation.  Reviewed monthly self breast exams.  Instructed to call with lumps, pain, or breast discharge.    RTC in 1 year or PRN with problems  Return in about 1 year (around 4/16/2026) for Annual physical.    Katie Martinez, JAY  04/16/2025

## 2025-04-17 LAB — REF LAB TEST METHOD: NORMAL

## 2025-07-02 ENCOUNTER — SPECIALTY PHARMACY (OUTPATIENT)
Dept: ONCOLOGY | Facility: HOSPITAL | Age: 35
End: 2025-07-02
Payer: COMMERCIAL

## 2025-07-02 NOTE — PROGRESS NOTES
Specialty Pharmacy Patient Management Program  Neurology Reassessment     Smita Brown is a 35 y.o. female with migraines seen by a Neurology provider and enrolled in the Neurology Patient Management program offered by [facility].  A follow-up outreach was conducted, including assessment of continued therapy appropriateness, medication adherence, and side effect incidence and management for Ubrelvy.     Changes to Insurance Coverage or Financial Support  No changes     Relevant Past Medical History and Comorbidities  Relevant medical history and concomitant health conditions were discussed with the patient. The patient's chart has been reviewed for relevant past medical history and comorbid health conditions and updated as necessary.   Past Medical History:   Diagnosis Date    Abnormal Pap smear of cervix     repeat ok    Anemia     Cluster headache     Migraine     Thumb fracture     concussions     Social History     Socioeconomic History    Marital status:    Tobacco Use    Smoking status: Never     Passive exposure: Never    Smokeless tobacco: Never   Vaping Use    Vaping status: Never Used   Substance and Sexual Activity    Alcohol use: Yes     Alcohol/week: 1.0 standard drink of alcohol     Types: 1 Glasses of wine per week     Comment: socially    Drug use: Never    Sexual activity: Yes     Partners: Male     Birth control/protection: Vasectomy     Problem list reviewed by Maria Isabel Manzano, PharmD on 7/2/2025 at  1:16 PM    Hospitalizations and Urgent Care Since Last Assessment  ED Visits, Admissions, or Hospitalizations: none   Urgent Office Visits: none     Allergies  Known allergies and reactions were discussed with the patient. The patient's chart has been reviewed for allergy information and updated as necessary.   No Known Allergies  Allergies reviewed by Maria Isabel Manzano, PharmD on 7/2/2025 at  1:16 PM        Current Medication List  This medication list has  been reviewed with the patient and evaluated for any interactions or necessary modifications/recommendations, and updated to include all prescription medications, OTC medications, and supplements the patient is currently taking.  This list reflects what is contained in the patient's profile, which has also been marked as reviewed to communicate to other providers it is the most up to date version of the patient's current medication therapy.     Current Outpatient Medications:     ubrogepant (Ubrelvy) 100 MG tablet, Take 1 tablet by mouth daily as needed for migraines - Take at onset of headache - May repeat once in 2 hours if needed (max of 200 mg/ 24 hrs), Disp: 16 tablet, Rfl: 11    Medicines reviewed by Maria Isabel Manzano, PharmD on 7/2/2025 at  1:16 PM    Drug Interactions  No relevant drug-drug interactions with specialty medication(s):  Ubrelvy.        Adverse Drug Reactions  Medication tolerability: Tolerating with no to minimal ADRs          Medication plan: Continue therapy with normal follow-up  Plan for ADR Management: not required      Adherence, Self-Administration, and Current Therapy Problems  Adherence related patient's specialty therapy was discussed with the patient. The Adherence segment of this outreach has been reviewed and updated.   Adherence Questions  Linked Medication(s) Assessed: Ubrogepant (UBRELVY)  On average, how many doses/injections does the patient miss per month?: 0 (prn med)  What are the identified reasons for non-adherence or missed doses? : no problems identified  What is the estimated medication adherence level?: %  Based on the patient/caregiver response and refill history, does this patient require an MTP to track adherence improvements?: no    Additional Barriers to Patient Self-Administration: none  Methods for Supporting Patient Self-Administration: n/a    Recently Close Medication Therapy Problems  No medication therapy recommendations to display  Open Medication  Therapy Problems  No medication therapy recommendations to display     Goals of Therapy  Goals related to the patient's specialty therapy was discussed with the patient. The Patient Goals segment of this outreach has been reviewed and updated.    Goals Addressed Today        Specialty Pharmacy General Goal      On Average, Reduce:   Symptom severity by 90% within 60 min of taking acute therapy.       Date of Reassessment Notes on Progress Toward Above Goals   7/2/25 Ubrelvy usually effective abortive with 1st dose.                                                        Quality of Life Assessment   Quality of Life related to the patient's enrollment in the patient management program and services provided was discussed with the patient. The QOL segment of this outreach has been reviewed and updated.   Quality of Life Improvement Scale: 8-Moderately better    Reassessment Plan & Follow-Up  Medication Therapy Changes: continue Ubrelvy 100 mg po once daily as needed for migraines - Take at onset of headache - May repeat x 1 in 2 hours if needed (max of 200 mg/ 24 hrs)  Related Plans, Therapy Recommendations, or Issues to Be Addressed: none  Pharmacist to perform regular reassessments no more than (6) months from the previous assessment.  Care Coordinator to set up future refill outreaches, coordinate prescription delivery, and escalate clinical questions to pharmacist.     Attestation  Therapeutic appropriateness: Appropriate  I attest the patient was actively involved in and has agreed to the above plan of care. If the prescribed therapy is at any point deemed not appropriate based on the current or future assessments, a consultation will be initiated with the patient's specialty care provider to determine the best course of action. The revised plan of therapy will be documented along with any additional patient education provided. Discussed aforementioned material with patient by phone.    Maria Isabel Manzano, DaijaD, BCPS    Clinic Specialty Pharmacist, Neurology  7/2/2025  13:18 EDT